# Patient Record
Sex: MALE | Race: WHITE | NOT HISPANIC OR LATINO | Employment: FULL TIME | URBAN - METROPOLITAN AREA
[De-identification: names, ages, dates, MRNs, and addresses within clinical notes are randomized per-mention and may not be internally consistent; named-entity substitution may affect disease eponyms.]

---

## 2017-05-27 LAB — RAPID GROUP A STREP (OHS): POSITIVE

## 2020-02-06 ENCOUNTER — HISTORICAL (OUTPATIENT)
Dept: LAB | Facility: HOSPITAL | Age: 35
End: 2020-02-06

## 2020-02-06 LAB
ABS NEUT (OLG): 7.08 X10(3)/MCL (ref 2.1–9.2)
ALBUMIN SERPL-MCNC: 4.3 GM/DL (ref 3.4–5)
ALBUMIN/GLOB SERPL: 1.8 {RATIO}
ALP SERPL-CCNC: 71 UNIT/L (ref 50–136)
ALT SERPL-CCNC: 64 UNIT/L (ref 12–78)
AST SERPL-CCNC: 18 UNIT/L (ref 15–37)
BASOPHILS # BLD AUTO: 0.1 X10(3)/MCL (ref 0–0.2)
BASOPHILS NFR BLD AUTO: 1 %
BILIRUB SERPL-MCNC: 0.5 MG/DL (ref 0.2–1)
BILIRUBIN DIRECT+TOT PNL SERPL-MCNC: 0.1 MG/DL (ref 0–0.2)
BILIRUBIN DIRECT+TOT PNL SERPL-MCNC: 0.4 MG/DL (ref 0–0.8)
BUN SERPL-MCNC: 10 MG/DL (ref 7–18)
CALCIUM SERPL-MCNC: 9.5 MG/DL (ref 8.5–10.1)
CHLORIDE SERPL-SCNC: 110 MMOL/L (ref 98–107)
CHOLEST SERPL-MCNC: 168 MG/DL (ref 0–200)
CHOLEST/HDLC SERPL: 5.6 {RATIO} (ref 0–5)
CO2 SERPL-SCNC: 25 MMOL/L (ref 21–32)
CREAT SERPL-MCNC: 0.93 MG/DL (ref 0.7–1.3)
EOSINOPHIL # BLD AUTO: 0.4 X10(3)/MCL (ref 0–0.9)
EOSINOPHIL NFR BLD AUTO: 4 %
ERYTHROCYTE [DISTWIDTH] IN BLOOD BY AUTOMATED COUNT: 12.6 % (ref 11.5–17)
EST. AVERAGE GLUCOSE BLD GHB EST-MCNC: 97 MG/DL
GLOBULIN SER-MCNC: 2.4 GM/DL (ref 2.4–3.5)
GLUCOSE SERPL-MCNC: 97 MG/DL (ref 74–106)
HBA1C MFR BLD: 5 % (ref 4.2–6.3)
HCT VFR BLD AUTO: 49.3 % (ref 42–52)
HDLC SERPL-MCNC: 30 MG/DL (ref 35–60)
HGB BLD-MCNC: 16.4 GM/DL (ref 14–18)
LDLC SERPL CALC-MCNC: 103 MG/DL (ref 0–129)
LYMPHOCYTES # BLD AUTO: 2.4 X10(3)/MCL (ref 0.6–4.6)
LYMPHOCYTES NFR BLD AUTO: 22 %
MCH RBC QN AUTO: 29.4 PG (ref 27–31)
MCHC RBC AUTO-ENTMCNC: 33.3 GM/DL (ref 33–36)
MCV RBC AUTO: 88.4 FL (ref 80–94)
MONOCYTES # BLD AUTO: 0.7 X10(3)/MCL (ref 0.1–1.3)
MONOCYTES NFR BLD AUTO: 7 %
NEUTROPHILS # BLD AUTO: 7.08 X10(3)/MCL (ref 2.1–9.2)
NEUTROPHILS NFR BLD AUTO: 65 %
PLATELET # BLD AUTO: 195 X10(3)/MCL (ref 130–400)
PMV BLD AUTO: 11.5 FL (ref 9.4–12.4)
POTASSIUM SERPL-SCNC: 4.3 MMOL/L (ref 3.5–5.1)
PROT SERPL-MCNC: 6.7 GM/DL (ref 6.4–8.2)
RBC # BLD AUTO: 5.58 X10(6)/MCL (ref 4.7–6.1)
SODIUM SERPL-SCNC: 141 MMOL/L (ref 136–145)
TRIGL SERPL-MCNC: 175 MG/DL (ref 30–150)
TSH SERPL-ACNC: 0.83 MIU/L (ref 0.36–3.74)
VLDLC SERPL CALC-MCNC: 35 MG/DL
WBC # SPEC AUTO: 10.8 X10(3)/MCL (ref 4.5–11.5)

## 2021-02-24 ENCOUNTER — HISTORICAL (OUTPATIENT)
Dept: ADMINISTRATIVE | Facility: HOSPITAL | Age: 36
End: 2021-02-24

## 2021-02-24 LAB
ABS NEUT (OLG): 6.46 X10(3)/MCL (ref 2.1–9.2)
ALBUMIN SERPL-MCNC: 4.5 GM/DL (ref 3.5–5)
ALBUMIN/GLOB SERPL: 2 RATIO (ref 1.1–2)
ALP SERPL-CCNC: 73 UNIT/L (ref 40–150)
ALT SERPL-CCNC: 50 UNIT/L (ref 0–55)
AST SERPL-CCNC: 29 UNIT/L (ref 5–34)
BASOPHILS # BLD AUTO: 0.1 X10(3)/MCL (ref 0–0.2)
BASOPHILS NFR BLD AUTO: 1 %
BILIRUB SERPL-MCNC: 0.6 MG/DL
BILIRUBIN DIRECT+TOT PNL SERPL-MCNC: 0.2 MG/DL (ref 0–0.5)
BILIRUBIN DIRECT+TOT PNL SERPL-MCNC: 0.4 MG/DL (ref 0–0.8)
BUN SERPL-MCNC: 11.6 MG/DL (ref 8.9–20.6)
CALCIUM SERPL-MCNC: 9.4 MG/DL (ref 8.4–10.2)
CHLORIDE SERPL-SCNC: 107 MMOL/L (ref 98–107)
CHOLEST SERPL-MCNC: 165 MG/DL
CHOLEST/HDLC SERPL: 6 {RATIO} (ref 0–5)
CO2 SERPL-SCNC: 27 MMOL/L (ref 22–29)
CREAT SERPL-MCNC: 1.04 MG/DL (ref 0.73–1.18)
EOSINOPHIL # BLD AUTO: 0.5 X10(3)/MCL (ref 0–0.9)
EOSINOPHIL NFR BLD AUTO: 5 %
ERYTHROCYTE [DISTWIDTH] IN BLOOD BY AUTOMATED COUNT: 12.6 % (ref 11.5–17)
EST. AVERAGE GLUCOSE BLD GHB EST-MCNC: 96.8 MG/DL
GLOBULIN SER-MCNC: 2.3 GM/DL (ref 2.4–3.5)
GLUCOSE SERPL-MCNC: 92 MG/DL (ref 74–100)
HBA1C MFR BLD: 5 %
HCT VFR BLD AUTO: 49 % (ref 42–52)
HDLC SERPL-MCNC: 29 MG/DL (ref 35–60)
HGB BLD-MCNC: 16.3 GM/DL (ref 14–18)
LDLC SERPL CALC-MCNC: 92 MG/DL (ref 50–140)
LYMPHOCYTES # BLD AUTO: 2.6 X10(3)/MCL (ref 0.6–4.6)
LYMPHOCYTES NFR BLD AUTO: 25 %
MCH RBC QN AUTO: 29.9 PG (ref 27–31)
MCHC RBC AUTO-ENTMCNC: 33.3 GM/DL (ref 33–36)
MCV RBC AUTO: 89.7 FL (ref 80–94)
MONOCYTES # BLD AUTO: 0.7 X10(3)/MCL (ref 0.1–1.3)
MONOCYTES NFR BLD AUTO: 7 %
NEUTROPHILS # BLD AUTO: 6.46 X10(3)/MCL (ref 2.1–9.2)
NEUTROPHILS NFR BLD AUTO: 62 %
PLATELET # BLD AUTO: 253 X10(3)/MCL (ref 130–400)
PMV BLD AUTO: 11.3 FL (ref 9.4–12.4)
POTASSIUM SERPL-SCNC: 5.4 MMOL/L (ref 3.5–5.1)
PROT SERPL-MCNC: 6.8 GM/DL (ref 6.4–8.3)
RBC # BLD AUTO: 5.46 X10(6)/MCL (ref 4.7–6.1)
SODIUM SERPL-SCNC: 142 MMOL/L (ref 136–145)
TRIGL SERPL-MCNC: 220 MG/DL (ref 34–140)
TSH SERPL-ACNC: 1.13 UIU/ML (ref 0.35–4.94)
VLDLC SERPL CALC-MCNC: 44 MG/DL
WBC # SPEC AUTO: 10.4 X10(3)/MCL (ref 4.5–11.5)

## 2022-03-11 ENCOUNTER — HISTORICAL (OUTPATIENT)
Dept: ADMINISTRATIVE | Facility: HOSPITAL | Age: 37
End: 2022-03-11

## 2022-03-11 LAB
ABS NEUT (OLG): 5.48 (ref 2.1–9.2)
ALBUMIN SERPL-MCNC: 4.6 G/DL (ref 3.5–5)
ALBUMIN/GLOB SERPL: 1.8 {RATIO} (ref 1.1–2)
ALP SERPL-CCNC: 68 U/L (ref 40–150)
ALT SERPL-CCNC: 57 U/L (ref 0–55)
AST SERPL-CCNC: 34 U/L (ref 5–34)
BASOPHILS # BLD AUTO: 0.1 10*3/UL (ref 0–0.2)
BASOPHILS NFR BLD AUTO: 1 %
BILIRUB SERPL-MCNC: 0.8 MG/DL
BILIRUBIN DIRECT+TOT PNL SERPL-MCNC: 0.3 (ref 0–0.5)
BILIRUBIN DIRECT+TOT PNL SERPL-MCNC: 0.5 (ref 0–0.8)
BUN SERPL-MCNC: 12.9 MG/DL (ref 8.9–20.6)
CALCIUM SERPL-MCNC: 10.2 MG/DL (ref 8.7–10.5)
CHLORIDE SERPL-SCNC: 103 MMOL/L (ref 98–107)
CHOLEST SERPL-MCNC: 194 MG/DL
CHOLEST/HDLC SERPL: 6 {RATIO} (ref 0–5)
CO2 SERPL-SCNC: 23 MMOL/L (ref 22–29)
CREAT SERPL-MCNC: 1.07 MG/DL (ref 0.73–1.18)
EOSINOPHIL # BLD AUTO: 0.4 10*3/UL (ref 0–0.9)
EOSINOPHIL NFR BLD AUTO: 4 %
ERYTHROCYTE [DISTWIDTH] IN BLOOD BY AUTOMATED COUNT: 14.9 % (ref 11.5–17)
EST. AVERAGE GLUCOSE BLD GHB EST-MCNC: 96.8 MG/DL
GLOBULIN SER-MCNC: 2.6 G/DL (ref 2.4–3.5)
GLUCOSE SERPL-MCNC: 31 MG/DL (ref 74–100)
HBA1C MFR BLD: 5 %
HCT VFR BLD AUTO: 52.4 % (ref 42–52)
HDLC SERPL-MCNC: 34 MG/DL (ref 35–60)
HEMOLYSIS INTERF INDEX SERPL-ACNC: 26
HGB BLD-MCNC: 16.6 G/DL (ref 14–18)
ICTERIC INTERF INDEX SERPL-ACNC: 1
LDLC SERPL CALC-MCNC: 130 MG/DL (ref 50–140)
LIPEMIC INTERF INDEX SERPL-ACNC: 8
LYMPHOCYTES # BLD AUTO: 2.1 10*3/UL (ref 0.6–4.6)
LYMPHOCYTES NFR BLD AUTO: 23 %
MANUAL DIFF? (OHS): NO
MCH RBC QN AUTO: 30.3 PG (ref 27–31)
MCHC RBC AUTO-ENTMCNC: 31.7 G/DL (ref 33–36)
MCV RBC AUTO: 95.8 FL (ref 80–94)
MONOCYTES # BLD AUTO: 0.8 10*3/UL (ref 0.1–1.3)
MONOCYTES NFR BLD AUTO: 8 %
NEUTROPHILS # BLD AUTO: 5.48 10*3/UL (ref 2.1–9.2)
NEUTROPHILS NFR BLD AUTO: 62 %
PLATELET # BLD AUTO: 225 10*3/UL (ref 130–400)
PMV BLD AUTO: 11.5 FL (ref 9.4–12.4)
POTASSIUM SERPL-SCNC: 4 MMOL/L (ref 3.5–5.1)
PROT SERPL-MCNC: 7.2 G/DL (ref 6.4–8.3)
RBC # BLD AUTO: 5.47 10*6/UL (ref 4.7–6.1)
SODIUM SERPL-SCNC: 144 MMOL/L (ref 136–145)
TRIGL SERPL-MCNC: 148 MG/DL (ref 34–140)
TSH SERPL-ACNC: 1.24 M[IU]/L (ref 0.35–4.94)
VLDLC SERPL CALC-MCNC: 30 MG/DL
WBC # SPEC AUTO: 8.8 10*3/UL (ref 4.5–11.5)

## 2022-04-10 ENCOUNTER — HISTORICAL (OUTPATIENT)
Dept: ADMINISTRATIVE | Facility: HOSPITAL | Age: 37
End: 2022-04-10

## 2022-04-30 VITALS
OXYGEN SATURATION: 98 % | HEIGHT: 69 IN | SYSTOLIC BLOOD PRESSURE: 134 MMHG | WEIGHT: 197.75 LBS | DIASTOLIC BLOOD PRESSURE: 85 MMHG | BODY MASS INDEX: 29.29 KG/M2

## 2022-09-16 ENCOUNTER — HISTORICAL (OUTPATIENT)
Dept: ADMINISTRATIVE | Facility: HOSPITAL | Age: 37
End: 2022-09-16

## 2022-10-26 ENCOUNTER — OFFICE VISIT (OUTPATIENT)
Dept: URGENT CARE | Facility: CLINIC | Age: 37
End: 2022-10-26
Payer: COMMERCIAL

## 2022-10-26 VITALS
BODY MASS INDEX: 29.18 KG/M2 | OXYGEN SATURATION: 96 % | TEMPERATURE: 102 F | RESPIRATION RATE: 18 BRPM | SYSTOLIC BLOOD PRESSURE: 118 MMHG | DIASTOLIC BLOOD PRESSURE: 79 MMHG | WEIGHT: 197 LBS | HEART RATE: 132 BPM | HEIGHT: 69 IN

## 2022-10-26 DIAGNOSIS — R68.89 FLU-LIKE SYMPTOMS: Primary | ICD-10-CM

## 2022-10-26 DIAGNOSIS — R05.9 COUGH, UNSPECIFIED TYPE: ICD-10-CM

## 2022-10-26 DIAGNOSIS — Z20.828 EXPOSURE TO THE FLU: ICD-10-CM

## 2022-10-26 LAB
CTP QC/QA: YES
CTP QC/QA: YES
FLUAV AG NPH QL: NEGATIVE
FLUBV AG NPH QL: NEGATIVE
SARS-COV-2 RDRP RESP QL NAA+PROBE: NEGATIVE

## 2022-10-26 PROCEDURE — 99213 OFFICE O/P EST LOW 20 MIN: CPT | Mod: 25,,, | Performed by: FAMILY MEDICINE

## 2022-10-26 PROCEDURE — 87804 POCT INFLUENZA A/B: ICD-10-PCS | Mod: QW,,, | Performed by: FAMILY MEDICINE

## 2022-10-26 PROCEDURE — 1159F PR MEDICATION LIST DOCUMENTED IN MEDICAL RECORD: ICD-10-PCS | Mod: CPTII,,, | Performed by: FAMILY MEDICINE

## 2022-10-26 PROCEDURE — 99213 PR OFFICE/OUTPT VISIT, EST, LEVL III, 20-29 MIN: ICD-10-PCS | Mod: 25,,, | Performed by: FAMILY MEDICINE

## 2022-10-26 PROCEDURE — 87804 INFLUENZA ASSAY W/OPTIC: CPT | Mod: QW,,, | Performed by: FAMILY MEDICINE

## 2022-10-26 PROCEDURE — 3078F DIAST BP <80 MM HG: CPT | Mod: CPTII,,, | Performed by: FAMILY MEDICINE

## 2022-10-26 PROCEDURE — 1160F PR REVIEW ALL MEDS BY PRESCRIBER/CLIN PHARMACIST DOCUMENTED: ICD-10-PCS | Mod: CPTII,,, | Performed by: FAMILY MEDICINE

## 2022-10-26 PROCEDURE — 1159F MED LIST DOCD IN RCRD: CPT | Mod: CPTII,,, | Performed by: FAMILY MEDICINE

## 2022-10-26 PROCEDURE — 3074F SYST BP LT 130 MM HG: CPT | Mod: CPTII,,, | Performed by: FAMILY MEDICINE

## 2022-10-26 PROCEDURE — 3078F PR MOST RECENT DIASTOLIC BLOOD PRESSURE < 80 MM HG: ICD-10-PCS | Mod: CPTII,,, | Performed by: FAMILY MEDICINE

## 2022-10-26 PROCEDURE — 87635: ICD-10-PCS | Mod: QW,,, | Performed by: FAMILY MEDICINE

## 2022-10-26 PROCEDURE — 1160F RVW MEDS BY RX/DR IN RCRD: CPT | Mod: CPTII,,, | Performed by: FAMILY MEDICINE

## 2022-10-26 PROCEDURE — 87635 SARS-COV-2 COVID-19 AMP PRB: CPT | Mod: QW,,, | Performed by: FAMILY MEDICINE

## 2022-10-26 PROCEDURE — 3074F PR MOST RECENT SYSTOLIC BLOOD PRESSURE < 130 MM HG: ICD-10-PCS | Mod: CPTII,,, | Performed by: FAMILY MEDICINE

## 2022-10-26 RX ORDER — BALOXAVIR MARBOXIL 80 MG/1
80 TABLET, FILM COATED ORAL ONCE
Qty: 1 TABLET | Refills: 0 | Status: SHIPPED | OUTPATIENT
Start: 2022-10-26 | End: 2022-10-26

## 2022-10-26 RX ORDER — OSELTAMIVIR PHOSPHATE 75 MG/1
75 CAPSULE ORAL 2 TIMES DAILY
Qty: 10 CAPSULE | Refills: 0 | Status: SHIPPED | OUTPATIENT
Start: 2022-10-26 | End: 2022-10-31

## 2022-10-26 NOTE — PATIENT INSTRUCTIONS
COVID negative flu negative.  Given your flu-like symptoms, your exposure to flu, we will treat for flu  Increase fluid intake. Monitor for fever. Take tylenol/acetaminophen as needed for headache, bodyaches or fever.   Treat your symptoms like the common cold, take Delysm as needed for cough, claritin and flonase for congestion, for exampl.   Complications for flu include pneumonia, bronchitis, and sinusitis.   Stay home for 5 to 7 days total starting from when your symptoms began.  If your symptoms worsen, or you develop shortness of breath, worsening of cough, or fever over 102.5, seek medical attention immediately.

## 2022-10-26 NOTE — PROGRESS NOTES
"Subjective:       Patient ID: Wilman Cano is a 36 y.o. male.    Vitals:  height is 5' 9" (1.753 m) and weight is 89.4 kg (197 lb). His oral temperature is 101.9 °F (38.8 °C) (abnormal). His blood pressure is 118/79 and his pulse is 132 (abnormal). His respiration is 18 and oxygen saturation is 96%.     Chief Complaint: Fever    36 y.o. male presents to clinic w/ c/o fever (high of 104.0), body aches, productive cough w/ green sputum x1d. Alleviating factors used include Dayquil w/ no improvement.  Denies any shortness of breath.  States that he was exposed to flu by his daughter.    Fever     Constitution: Positive for fever.   HENT: Negative.     Neck: neck negative.   Cardiovascular: Negative.    Eyes: Negative.    Respiratory: Negative.     Gastrointestinal: Negative.    Genitourinary: Negative.    Musculoskeletal: Negative.    Skin: Negative.    Allergic/Immunologic: Negative.    Neurological: Negative.    Hematologic/Lymphatic: Negative.      Objective:      Physical Exam   Constitutional: He is oriented to person, place, and time. He appears well-developed. He is cooperative.  Non-toxic appearance. He does not appear ill. No distress.   HENT:   Head: Normocephalic and atraumatic.   Ears:   Right Ear: Hearing and external ear normal.   Left Ear: Hearing and external ear normal.   Nose: No mucosal edema or nasal deformity. No epistaxis. Right sinus exhibits no maxillary sinus tenderness and no frontal sinus tenderness. Left sinus exhibits no maxillary sinus tenderness and no frontal sinus tenderness.   Mouth/Throat: Uvula is midline and mucous membranes are normal. No trismus in the jaw. Normal dentition. No uvula swelling. No posterior oropharyngeal edema or posterior oropharyngeal erythema.   Eyes: Conjunctivae and lids are normal.   Neck: Trachea normal and phonation normal. Neck supple. No edema present. No erythema present. No neck rigidity present.   Cardiovascular: Normal rate, regular rhythm and normal " "heart sounds.   Pulmonary/Chest: Effort normal and breath sounds normal. No stridor. No respiratory distress. He has no decreased breath sounds. He has no wheezes. He has no rhonchi. He has no rales.   Abdominal: Normal appearance.   Neurological: He is alert and oriented to person, place, and time. He exhibits normal muscle tone.   Skin: Skin is warm, intact and not diaphoretic.   Psychiatric: His speech is normal and behavior is normal. Mood, judgment and thought content normal.   Nursing note and vitals reviewed.         Previous History      Review of patient's allergies indicates:  No Known Allergies    Past Medical History:   Diagnosis Date    Known health problems: none      Current Outpatient Medications   Medication Instructions    oseltamivir (TAMIFLU) 75 mg, Oral, 2 times daily    XOFLUZA 80 mg, Oral, Once     Past Surgical History:   Procedure Laterality Date    NO PAST SURGERIES       Family History   Problem Relation Age of Onset    No Known Problems Mother     No Known Problems Father     No Known Problems Sister     No Known Problems Brother        Social History     Tobacco Use    Smoking status: Every Day     Types: Cigarettes    Smokeless tobacco: Never   Substance Use Topics    Alcohol use: Never    Drug use: Never        Physical Exam      Vital Signs Reviewed   /79   Pulse (!) 132   Temp (!) 101.9 °F (38.8 °C) (Oral)   Resp 18   Ht 5' 9" (1.753 m)   Wt 89.4 kg (197 lb)   SpO2 96%   BMI 29.09 kg/m²        Procedures    Procedures     Labs     Results for orders placed or performed in visit on 10/26/22   POCT COVID-19 Rapid Screening   Result Value Ref Range    POC Rapid COVID Negative Negative     Acceptable Yes    POCT Influenza A/B   Result Value Ref Range    Rapid Influenza A Ag Negative Negative    Rapid Influenza B Ag Negative Negative     Acceptable Yes        Assessment:       1. Flu-like symptoms    2. Cough, unspecified type    3. Exposure to " the flu          Plan:       COVID negative flu negative.  Given your flu-like symptoms, your exposure to flu, we will treat for flu  Increase fluid intake. Monitor for fever. Take tylenol/acetaminophen as needed for headache, bodyaches or fever.   Treat your symptoms like the common cold, take Delysm as needed for cough, claritin and flonase for congestion, for exampl.   Complications for flu include pneumonia, bronchitis, and sinusitis.   Stay home for 5 to 7 days total starting from when your symptoms began.  If your symptoms worsen, or you develop shortness of breath, worsening of cough, or fever over 102.5, seek medical attention immediately.     Flu-like symptoms    Cough, unspecified type  -     POCT COVID-19 Rapid Screening  -     POCT Influenza A/B    Exposure to the flu    Other orders  -     baloxavir marboxiL (XOFLUZA) 80 mg tablet; Take 1 tablet (80 mg total) by mouth once. for 1 dose  Dispense: 1 tablet; Refill: 0  -     oseltamivir (TAMIFLU) 75 MG capsule; Take 1 capsule (75 mg total) by mouth 2 (two) times daily. for 5 days  Dispense: 10 capsule; Refill: 0

## 2023-03-09 DIAGNOSIS — Z13.220 ENCOUNTER FOR LIPID SCREENING FOR CARDIOVASCULAR DISEASE: ICD-10-CM

## 2023-03-09 DIAGNOSIS — Z00.00 ENCOUNTER FOR WELLNESS EXAMINATION: Primary | ICD-10-CM

## 2023-03-09 DIAGNOSIS — Z13.6 ENCOUNTER FOR LIPID SCREENING FOR CARDIOVASCULAR DISEASE: ICD-10-CM

## 2023-03-13 ENCOUNTER — CLINICAL SUPPORT (OUTPATIENT)
Dept: PRIMARY CARE CLINIC | Facility: CLINIC | Age: 38
End: 2023-03-13
Payer: COMMERCIAL

## 2023-03-13 DIAGNOSIS — Z13.6 ENCOUNTER FOR LIPID SCREENING FOR CARDIOVASCULAR DISEASE: ICD-10-CM

## 2023-03-13 DIAGNOSIS — Z00.00 ENCOUNTER FOR WELLNESS EXAMINATION: Primary | ICD-10-CM

## 2023-03-13 DIAGNOSIS — Z13.220 ENCOUNTER FOR LIPID SCREENING FOR CARDIOVASCULAR DISEASE: ICD-10-CM

## 2023-03-13 LAB
ALBUMIN SERPL-MCNC: 4.2 G/DL (ref 3.5–5)
ALBUMIN/GLOB SERPL: 1.7 RATIO (ref 1.1–2)
ALP SERPL-CCNC: 72 UNIT/L (ref 40–150)
ALT SERPL-CCNC: 38 UNIT/L (ref 0–55)
AST SERPL-CCNC: 32 UNIT/L (ref 5–34)
BASOPHILS # BLD AUTO: 0.1 X10(3)/MCL (ref 0–0.2)
BASOPHILS NFR BLD AUTO: 1 %
BILIRUBIN DIRECT+TOT PNL SERPL-MCNC: 0.4 MG/DL
BUN SERPL-MCNC: 8.1 MG/DL (ref 8.9–20.6)
CALCIUM SERPL-MCNC: 9.5 MG/DL (ref 8.4–10.2)
CHLORIDE SERPL-SCNC: 111 MMOL/L (ref 98–107)
CHOLEST SERPL-MCNC: 155 MG/DL
CHOLEST/HDLC SERPL: 5 {RATIO} (ref 0–5)
CO2 SERPL-SCNC: 26 MMOL/L (ref 22–29)
CREAT SERPL-MCNC: 1.06 MG/DL (ref 0.73–1.18)
EOSINOPHIL # BLD AUTO: 0.35 X10(3)/MCL (ref 0–0.9)
EOSINOPHIL NFR BLD AUTO: 3.3 %
ERYTHROCYTE [DISTWIDTH] IN BLOOD BY AUTOMATED COUNT: 13 % (ref 11.5–17)
EST. AVERAGE GLUCOSE BLD GHB EST-MCNC: 99.7 MG/DL
GFR SERPLBLD CREATININE-BSD FMLA CKD-EPI: >60 MLS/MIN/1.73/M2
GLOBULIN SER-MCNC: 2.5 GM/DL (ref 2.4–3.5)
GLUCOSE SERPL-MCNC: 107 MG/DL (ref 74–100)
HBA1C MFR BLD: 5.1 %
HCT VFR BLD AUTO: 47.4 % (ref 42–52)
HDLC SERPL-MCNC: 31 MG/DL (ref 35–60)
HGB BLD-MCNC: 16.2 G/DL (ref 14–18)
IMM GRANULOCYTES # BLD AUTO: 0.12 X10(3)/MCL (ref 0–0.04)
IMM GRANULOCYTES NFR BLD AUTO: 1.1 %
LDLC SERPL CALC-MCNC: 54 MG/DL (ref 50–140)
LYMPHOCYTES # BLD AUTO: 2.68 X10(3)/MCL (ref 0.6–4.6)
LYMPHOCYTES NFR BLD AUTO: 25.6 %
MCH RBC QN AUTO: 29.8 PG
MCHC RBC AUTO-ENTMCNC: 34.2 G/DL (ref 33–36)
MCV RBC AUTO: 87.1 FL (ref 80–94)
MONOCYTES # BLD AUTO: 0.66 X10(3)/MCL (ref 0.1–1.3)
MONOCYTES NFR BLD AUTO: 6.3 %
NEUTROPHILS # BLD AUTO: 6.57 X10(3)/MCL (ref 2.1–9.2)
NEUTROPHILS NFR BLD AUTO: 62.7 %
NRBC BLD AUTO-RTO: 0 %
PLATELET # BLD AUTO: 209 X10(3)/MCL (ref 130–400)
PMV BLD AUTO: 11.7 FL (ref 7.4–10.4)
POTASSIUM SERPL-SCNC: 5 MMOL/L (ref 3.5–5.1)
PROT SERPL-MCNC: 6.7 GM/DL (ref 6.4–8.3)
RBC # BLD AUTO: 5.44 X10(6)/MCL (ref 4.7–6.1)
SODIUM SERPL-SCNC: 143 MMOL/L (ref 136–145)
TRIGL SERPL-MCNC: 349 MG/DL (ref 34–140)
TSH SERPL-ACNC: 1.91 UIU/ML (ref 0.35–4.94)
VLDLC SERPL CALC-MCNC: 70 MG/DL
WBC # SPEC AUTO: 10.5 X10(3)/MCL (ref 4.5–11.5)

## 2023-03-13 PROCEDURE — 80053 COMPREHEN METABOLIC PANEL: CPT | Performed by: GENERAL PRACTICE

## 2023-03-13 PROCEDURE — 80061 LIPID PANEL: CPT | Performed by: GENERAL PRACTICE

## 2023-03-13 PROCEDURE — 36415 COLL VENOUS BLD VENIPUNCTURE: CPT

## 2023-03-13 PROCEDURE — 36415 PR COLLECTION VENOUS BLOOD,VENIPUNCTURE: ICD-10-PCS | Mod: ,,, | Performed by: GENERAL PRACTICE

## 2023-03-13 PROCEDURE — 86803 HEPATITIS C AB TEST: CPT | Performed by: GENERAL PRACTICE

## 2023-03-13 PROCEDURE — 83036 HEMOGLOBIN GLYCOSYLATED A1C: CPT | Performed by: GENERAL PRACTICE

## 2023-03-13 PROCEDURE — 84443 ASSAY THYROID STIM HORMONE: CPT | Performed by: GENERAL PRACTICE

## 2023-03-13 PROCEDURE — 36415 COLL VENOUS BLD VENIPUNCTURE: CPT | Mod: ,,, | Performed by: GENERAL PRACTICE

## 2023-03-13 PROCEDURE — 85025 COMPLETE CBC W/AUTO DIFF WBC: CPT | Performed by: GENERAL PRACTICE

## 2023-03-14 LAB — HCV AB SERPL QL IA: NONREACTIVE

## 2023-03-15 PROBLEM — E78.2 MIXED HYPERLIPIDEMIA: Status: RESOLVED | Noted: 2023-03-15 | Resolved: 2023-03-15

## 2023-03-15 PROBLEM — E66.9 OBESITY: Chronic | Status: ACTIVE | Noted: 2023-03-15

## 2023-03-15 PROBLEM — E78.5 DYSLIPIDEMIA: Chronic | Status: ACTIVE | Noted: 2023-03-15

## 2023-03-15 PROBLEM — E78.5 DYSLIPIDEMIA: Status: ACTIVE | Noted: 2023-03-15

## 2023-03-15 PROBLEM — E78.2 MIXED HYPERLIPIDEMIA: Status: ACTIVE | Noted: 2023-03-15

## 2023-03-15 PROBLEM — F17.210 CIGARETTE NICOTINE DEPENDENCE WITHOUT COMPLICATION: Chronic | Status: ACTIVE | Noted: 2023-03-15

## 2023-03-15 PROBLEM — F17.200 NICOTINE DEPENDENCE: Status: ACTIVE | Noted: 2023-03-15

## 2023-03-15 PROBLEM — E66.9 OBESITY: Status: ACTIVE | Noted: 2023-03-15

## 2023-03-15 NOTE — PROGRESS NOTES
"Subjective:       Patient ID: Wilman Cano is a 37 y.o. male.    Chief Complaint: Annual Exam    Patient presents to the clinic today for wellness examination.  Current and past medical history reviewed.  Pertinent family and social history reviewed.    Vaccinations due:  Patient will receive any vaccinations that are currently due and desired.    No complaints today.      Review of Systems   Constitutional: Negative.  Negative for fatigue and fever.   HENT: Negative.  Negative for sore throat and trouble swallowing.    Eyes: Negative.  Negative for redness and visual disturbance.   Respiratory: Negative.  Negative for chest tightness and shortness of breath.    Cardiovascular: Negative.  Negative for chest pain.   Gastrointestinal: Negative.  Negative for abdominal pain, blood in stool and nausea.   Endocrine: Negative.  Negative for cold intolerance, heat intolerance and polyuria.   Genitourinary: Negative.    Musculoskeletal: Negative.  Negative for arthralgias, gait problem and joint swelling.   Integumentary:  Negative for rash. Negative.   Neurological: Negative.  Negative for dizziness, weakness and headaches.   Psychiatric/Behavioral: Negative.  Negative for agitation and dysphoric mood.        Objective:     Vitals:    03/16/23 0748   BP: (!) 144/83   Pulse: 87   Resp: 18   SpO2: 98%   Weight: 90.7 kg (200 lb)   Height: 5' 9" (1.753 m)   Body mass index is 29.53 kg/m².     Physical Exam  Constitutional:       Appearance: Normal appearance.   HENT:      Head: Normocephalic.      Mouth/Throat:      Pharynx: Oropharynx is clear.   Eyes:      Conjunctiva/sclera: Conjunctivae normal.      Pupils: Pupils are equal, round, and reactive to light.   Cardiovascular:      Rate and Rhythm: Normal rate and regular rhythm.      Heart sounds: Normal heart sounds.   Pulmonary:      Effort: Pulmonary effort is normal.      Breath sounds: Normal breath sounds.   Abdominal:      General: Abdomen is flat.      Palpations: " Abdomen is soft.   Musculoskeletal:         General: Normal range of motion.      Cervical back: Neck supple.   Skin:     General: Skin is warm and dry.   Neurological:      General: No focal deficit present.      Mental Status: He is oriented to person, place, and time.   Psychiatric:         Mood and Affect: Mood normal.         Behavior: Behavior normal.         Thought Content: Thought content normal.         Judgment: Judgment normal.         Lab Results   Component Value Date     03/13/2023    K 5.0 03/13/2023    CO2 26 03/13/2023    BUN 8.1 (L) 03/13/2023    CREATININE 1.06 03/13/2023    CALCIUM 9.5 03/13/2023    ALBUMIN 4.2 03/13/2023    BILITOT 0.4 03/13/2023    ALKPHOS 72 03/13/2023    AST 32 03/13/2023    ALT 38 03/13/2023    EGFRNONAA >60 03/11/2022     Lab Results   Component Value Date    WBC 10.5 03/13/2023    RBC 5.44 03/13/2023    HGB 16.2 03/13/2023    HCT 47.4 03/13/2023    MCV 87.1 03/13/2023    RDW 13.0 03/13/2023     03/13/2023      Lab Results   Component Value Date    CHOL 155 03/13/2023    TRIG 349 (H) 03/13/2023    HDL 31 (L) 03/13/2023    LDL 54.00 03/13/2023    TOTALCHOLEST 5 03/13/2023     Lab Results   Component Value Date    TSH 1.906 03/13/2023     Lab Results   Component Value Date    HGBA1C 5.1 03/13/2023          Assessment:     Problem List Items Addressed This Visit          Derm    Hand dermatitis (Chronic)    Current Assessment & Plan     Patient does have hand dermatitis, not severe but more than likely something that he is coming into contact with at work.  I prescribed triamcinolone for him to use as needed, and he should watch his environment, hopefully avoiding anything that is causing this in the future.         Relevant Medications    triamcinolone acetonide 0.1% (KENALOG) 0.1 % cream       Cardiac/Vascular    Dyslipidemia (Chronic)    Current Assessment & Plan     Lipids continue to be elevated, triglycerides look somewhat worse, LDL looks better.  HDL  "continues to be low.  We will continue lifestyle modification at this time.      Component Ref Range & Units 3 d ago 1 yr ago 2 yr ago 3 yr ago   Cholesterol Total <=200 mg/dL 155  194 R  165 R  168 R    HDL Cholesterol 35 - 60 mg/dL 31 Low   34 R  29 Low   30 Low     Triglyceride 34 - 140 mg/dL 349 High   148 R  220 High   175 High  R    Cholesterol/HDL Ratio 0 - 5 5  6  6 High   5.6 High  R    Very Low Density Lipoprotein  70  30  44  35 R    LDL Cholesterol 50.00 - 140.00 mg/dL 54.00  130.00 R  92.00  103 R        Consume a diet low in saturated fats, (fats that are solid at room temperature such as animal fat) and trans fats, using healthy fats if necessary, olive oil and canola oil are 2 examples.  Increasing daily fiber intake can be very important in controlling cholesterol elevation as well.  Weight loss, especially weight loss associated with exercise can significantly lower lipid levels.  During future visits, depending on response to dietary changes, medication or change in dosage if already on lipid lowering medications may be considered if lipid levels continue to be significantly elevated.         Relevant Orders    Lipid Panel    Family history of early CAD (Chronic)    Current Assessment & Plan     We will continue to monitor all modifiable risk factors to reduce overall cardiac risk.            Endocrine    Overweight (BMI 25.0-29.9) (Chronic)    Overview     Weight loss, healthy dietary choices, increased activity/exercise are recommended.  To lose weight, it is generally recommended to restrict calories to approximately 1500 calories per day to lose 1 lb per week, and approximately 1200 calories per day to lose up to 2 lb per week.  Generally, this is a healthy amount of weight to lose, and an appropriate amount of time to lose this amount of weight.  Adding exercise will assist in losing weight, particularly aerobic exercise such as walking.  However, resistance training, or lifting weights" " "over time may assistant weight loss by increasing basal metabolic rate, or the rate at which your body burns calories during periods of inactivity.    Keep track of BMI (body mass index), below 25 is considered normal, over 25 but below 30 is considered overweight, anything over 30 is considered moderately obese, over 35 severely obese, and over 40 is characterized as morbidly obese.         RESOLVED: Obesity (Chronic)    Current Assessment & Plan     Weight loss, healthy dietary choices, increased activity/exercise are recommended.  To lose weight, it is generally recommended to restrict calories to approximately 1500 calories per day to lose 1 lb per week, and approximately 1200 calories per day to lose up to 2 lb per week.  Generally, this is a healthy amount of weight to lose, and an appropriate amount of time to lose this amount of weight.  Adding exercise will assist in losing weight, particularly aerobic exercise such as walking.  However, resistance training, or lifting weights" over time may assistant weight loss by increasing basal metabolic rate, or the rate at which your body burns calories during periods of inactivity.    Keep track of BMI (body mass index), below 25 is considered normal, over 25 but below 30 is considered overweight, anything over 30 is considered moderately obese, over 35 severely obese, and over 40 is characterized as morbidly obese.            Other    Cigarette nicotine dependence without complication (Chronic)    Current Assessment & Plan     We spoke for quite a while about smoking cessation, I will prescribe Chantix, he seems to be serious about quitting.  We will revisit this issue at his next visit in three months.         Relevant Medications    varenicline (CHANTIX STARTING MONTH BOX) 0.5 mg (11)- 1 mg (42) tablet    varenicline (CHANTIX) 1 mg Tab    Other Relevant Orders    Lipid Panel     Other Visit Diagnoses       Wellness examination    -  Primary    Overall health " status was reviewed.  Good health habits reinforced.  Annual wellness exams recommended.    Relevant Orders    TSH    Hemoglobin A1C    Lipid Panel    Comprehensive Metabolic Panel    CBC Auto Differential                 Plan:             Follow up in about 53 weeks (around 3/21/2024) for Wellness.

## 2023-03-15 NOTE — ASSESSMENT & PLAN NOTE
We spoke for quite a while about smoking cessation, I will prescribe Chantix, he seems to be serious about quitting.  We will revisit this issue at his next visit in three months.

## 2023-03-15 NOTE — ASSESSMENT & PLAN NOTE
"Weight loss, healthy dietary choices, increased activity/exercise are recommended.  To lose weight, it is generally recommended to restrict calories to approximately 1500 calories per day to lose 1 lb per week, and approximately 1200 calories per day to lose up to 2 lb per week.  Generally, this is a healthy amount of weight to lose, and an appropriate amount of time to lose this amount of weight.  Adding exercise will assist in losing weight, particularly aerobic exercise such as walking.  However, resistance training, or lifting weights" over time may assistant weight loss by increasing basal metabolic rate, or the rate at which your body burns calories during periods of inactivity.    Keep track of BMI (body mass index), below 25 is considered normal, over 25 but below 30 is considered overweight, anything over 30 is considered moderately obese, over 35 severely obese, and over 40 is characterized as morbidly obese.  "

## 2023-03-15 NOTE — ASSESSMENT & PLAN NOTE
Lipids continue to be elevated, triglycerides look somewhat worse, LDL looks better.  HDL continues to be low.  We will continue lifestyle modification at this time.      Component Ref Range & Units 3 d ago 1 yr ago 2 yr ago 3 yr ago   Cholesterol Total <=200 mg/dL 155  194 R  165 R  168 R    HDL Cholesterol 35 - 60 mg/dL 31 Low   34 R  29 Low   30 Low     Triglyceride 34 - 140 mg/dL 349 High   148 R  220 High   175 High  R    Cholesterol/HDL Ratio 0 - 5 5  6  6 High   5.6 High  R    Very Low Density Lipoprotein  70  30  44  35 R    LDL Cholesterol 50.00 - 140.00 mg/dL 54.00  130.00 R  92.00  103 R        Consume a diet low in saturated fats, (fats that are solid at room temperature such as animal fat) and trans fats, using healthy fats if necessary, olive oil and canola oil are 2 examples.  Increasing daily fiber intake can be very important in controlling cholesterol elevation as well.  Weight loss, especially weight loss associated with exercise can significantly lower lipid levels.  During future visits, depending on response to dietary changes, medication or change in dosage if already on lipid lowering medications may be considered if lipid levels continue to be significantly elevated.

## 2023-03-16 ENCOUNTER — OFFICE VISIT (OUTPATIENT)
Dept: PRIMARY CARE CLINIC | Facility: CLINIC | Age: 38
End: 2023-03-16
Payer: COMMERCIAL

## 2023-03-16 VITALS
HEIGHT: 69 IN | SYSTOLIC BLOOD PRESSURE: 144 MMHG | RESPIRATION RATE: 18 BRPM | BODY MASS INDEX: 29.62 KG/M2 | HEART RATE: 87 BPM | WEIGHT: 200 LBS | OXYGEN SATURATION: 98 % | DIASTOLIC BLOOD PRESSURE: 83 MMHG

## 2023-03-16 DIAGNOSIS — E66.9 OBESITY, UNSPECIFIED CLASSIFICATION, UNSPECIFIED OBESITY TYPE, UNSPECIFIED WHETHER SERIOUS COMORBIDITY PRESENT: Chronic | ICD-10-CM

## 2023-03-16 DIAGNOSIS — F17.210 CIGARETTE NICOTINE DEPENDENCE WITHOUT COMPLICATION: Chronic | ICD-10-CM

## 2023-03-16 DIAGNOSIS — L30.9 HAND DERMATITIS: Chronic | ICD-10-CM

## 2023-03-16 DIAGNOSIS — Z82.49 FAMILY HISTORY OF EARLY CAD: ICD-10-CM

## 2023-03-16 DIAGNOSIS — E66.3 OVERWEIGHT (BMI 25.0-29.9): Chronic | ICD-10-CM

## 2023-03-16 DIAGNOSIS — E78.5 DYSLIPIDEMIA: Chronic | ICD-10-CM

## 2023-03-16 DIAGNOSIS — Z00.00 WELLNESS EXAMINATION: Primary | ICD-10-CM

## 2023-03-16 PROCEDURE — 3079F DIAST BP 80-89 MM HG: CPT | Mod: CPTII,,, | Performed by: GENERAL PRACTICE

## 2023-03-16 PROCEDURE — 3077F SYST BP >= 140 MM HG: CPT | Mod: CPTII,,, | Performed by: GENERAL PRACTICE

## 2023-03-16 PROCEDURE — 1160F RVW MEDS BY RX/DR IN RCRD: CPT | Mod: CPTII,,, | Performed by: GENERAL PRACTICE

## 2023-03-16 PROCEDURE — 3079F PR MOST RECENT DIASTOLIC BLOOD PRESSURE 80-89 MM HG: ICD-10-PCS | Mod: CPTII,,, | Performed by: GENERAL PRACTICE

## 2023-03-16 PROCEDURE — 99395 PR PREVENTIVE VISIT,EST,18-39: ICD-10-PCS | Mod: ,,, | Performed by: GENERAL PRACTICE

## 2023-03-16 PROCEDURE — 99395 PREV VISIT EST AGE 18-39: CPT | Mod: ,,, | Performed by: GENERAL PRACTICE

## 2023-03-16 PROCEDURE — 3008F PR BODY MASS INDEX (BMI) DOCUMENTED: ICD-10-PCS | Mod: CPTII,,, | Performed by: GENERAL PRACTICE

## 2023-03-16 PROCEDURE — 1159F PR MEDICATION LIST DOCUMENTED IN MEDICAL RECORD: ICD-10-PCS | Mod: CPTII,,, | Performed by: GENERAL PRACTICE

## 2023-03-16 PROCEDURE — 3008F BODY MASS INDEX DOCD: CPT | Mod: CPTII,,, | Performed by: GENERAL PRACTICE

## 2023-03-16 PROCEDURE — 3077F PR MOST RECENT SYSTOLIC BLOOD PRESSURE >= 140 MM HG: ICD-10-PCS | Mod: CPTII,,, | Performed by: GENERAL PRACTICE

## 2023-03-16 PROCEDURE — 1160F PR REVIEW ALL MEDS BY PRESCRIBER/CLIN PHARMACIST DOCUMENTED: ICD-10-PCS | Mod: CPTII,,, | Performed by: GENERAL PRACTICE

## 2023-03-16 PROCEDURE — 1159F MED LIST DOCD IN RCRD: CPT | Mod: CPTII,,, | Performed by: GENERAL PRACTICE

## 2023-03-16 RX ORDER — TRIAMCINOLONE ACETONIDE 1 MG/G
CREAM TOPICAL 2 TIMES DAILY
Qty: 30 G | Refills: 2 | Status: SHIPPED | OUTPATIENT
Start: 2023-03-16 | End: 2023-09-15 | Stop reason: SDUPTHER

## 2023-03-16 RX ORDER — VARENICLINE TARTRATE 1 MG/1
1 TABLET, FILM COATED ORAL 2 TIMES DAILY
Qty: 60 TABLET | Refills: 1 | Status: SHIPPED | OUTPATIENT
Start: 2023-03-16 | End: 2023-04-18 | Stop reason: SDUPTHER

## 2023-03-16 RX ORDER — VARENICLINE TARTRATE 0.5 (11)-1
KIT ORAL
Qty: 1 EACH | Refills: 0 | Status: SHIPPED | OUTPATIENT
Start: 2023-03-16 | End: 2023-06-16

## 2023-03-16 NOTE — PATIENT INSTRUCTIONS
Consume a diet low in saturated fats, (fats that are solid at room temperature such as animal fat) and trans fats, using healthy fats if necessary, olive oil and canola oil are 2 examples.  Increasing daily fiber intake can be very important in controlling cholesterol elevation as well.  Weight loss, especially weight loss associated with exercise can significantly lower lipid levels.  During future visits, depending on response to dietary changes, medication or change in dosage if already on lipid lowering medications may be considered if lipid levels continue to be significantly elevated.    After starting Chantix, there are 3 ways to quit smoking.  First way is to quit after taking medication for 1 week, some people find this method works best because there is a definite quit date chosen prior to starting Chantix.    Second ways to quit smoking during the 1st month of medication, this allows more time for the one to quit, but is still more aggressive than other ways, guaranteeing that as the medication becomes effective, quitting in the 1st month may be easier.    The 3rd way is to gradually quit smoking over the 3 months of taking the medication.  This is not aggressive but can be more successful if the patient would rather quit smoking more gradually.  However, this could be less successful if the patient ultimately does not quit by the end of the 3 month course of Chantix.    Regardless of the method, if 1 is motivated and dedicated quitting smoking, Chantix can be a very successful tool.

## 2023-03-16 NOTE — ASSESSMENT & PLAN NOTE
Patient does have hand dermatitis, not severe but more than likely something that he is coming into contact with at work.  I prescribed triamcinolone for him to use as needed, and he should watch his environment, hopefully avoiding anything that is causing this in the future.

## 2023-06-12 ENCOUNTER — CLINICAL SUPPORT (OUTPATIENT)
Dept: PRIMARY CARE CLINIC | Facility: CLINIC | Age: 38
End: 2023-06-12
Payer: COMMERCIAL

## 2023-06-12 DIAGNOSIS — F17.210 CIGARETTE NICOTINE DEPENDENCE WITHOUT COMPLICATION: Chronic | ICD-10-CM

## 2023-06-12 DIAGNOSIS — E78.00 ELEVATED CHOLESTEROL: Primary | ICD-10-CM

## 2023-06-12 LAB
CHOLEST SERPL-MCNC: 167 MG/DL
CHOLEST/HDLC SERPL: 5 {RATIO} (ref 0–5)
HDLC SERPL-MCNC: 32 MG/DL (ref 35–60)
LDLC SERPL CALC-MCNC: 92 MG/DL (ref 50–140)
TRIGL SERPL-MCNC: 217 MG/DL (ref 34–140)
VLDLC SERPL CALC-MCNC: 43 MG/DL

## 2023-06-12 PROCEDURE — 80061 LIPID PANEL: CPT | Performed by: GENERAL PRACTICE

## 2023-06-12 PROCEDURE — 36415 PR COLLECTION VENOUS BLOOD,VENIPUNCTURE: ICD-10-PCS | Mod: ,,, | Performed by: GENERAL PRACTICE

## 2023-06-12 PROCEDURE — 36415 COLL VENOUS BLD VENIPUNCTURE: CPT | Mod: ,,, | Performed by: GENERAL PRACTICE

## 2023-06-12 PROCEDURE — 36415 COLL VENOUS BLD VENIPUNCTURE: CPT

## 2023-06-12 NOTE — PROGRESS NOTES
Patient presented for nurse visit/Blood draw. 22g needle X 1 attempt in right antecubital.  Patient tolerated procedure well.

## 2023-06-15 NOTE — PROGRESS NOTES
"Subjective:       Patient ID: Wilman Cano is a 37 y.o. male.    Chief Complaint: Follow-up and Hyperlipidemia    Established patient, presents to the clinic for recheck, lifestyle modification for dyslipidemia, and smoking cessation, prescribed Chantix three months ago.  He has successfully quit smoking, S cholesterol numbers do look better.  However, his blood pressures from home show some signs of mild-to-moderate hypertension.  Again, he does have a significant family history of coronary artery disease, his dad he did have a diagnosis of CAD.    Review of Systems   Constitutional: Negative.  Negative for fatigue and fever.   HENT: Negative.  Negative for sore throat and trouble swallowing.    Eyes: Negative.  Negative for redness and visual disturbance.   Respiratory: Negative.  Negative for chest tightness and shortness of breath.    Cardiovascular: Negative.  Negative for chest pain.   Gastrointestinal: Negative.  Negative for abdominal pain, blood in stool and nausea.   Endocrine: Negative.  Negative for cold intolerance, heat intolerance and polyuria.   Genitourinary: Negative.    Musculoskeletal: Negative.  Negative for arthralgias, gait problem and joint swelling.   Integumentary:  Negative for rash. Negative.   Neurological: Negative.  Negative for dizziness, weakness and headaches.   Psychiatric/Behavioral: Negative.  Negative for agitation and dysphoric mood.        Objective:   Visit Vitals  /88   Pulse 88   Resp 18   Ht 5' 9" (1.753 m)   Wt 97.5 kg (215 lb)   SpO2 97%   BMI 31.75 kg/m²        Physical Exam  Constitutional:       Appearance: Normal appearance.   Eyes:      Conjunctiva/sclera: Conjunctivae normal.   Cardiovascular:      Rate and Rhythm: Normal rate and regular rhythm.   Pulmonary:      Effort: Pulmonary effort is normal.      Breath sounds: Normal breath sounds.   Skin:     General: Skin is warm and dry.   Neurological:      Mental Status: He is alert.   Psychiatric:         Mood " and Affect: Mood normal.         Behavior: Behavior normal.         Lab Results   Component Value Date     03/13/2023    K 5.0 03/13/2023    CO2 26 03/13/2023    BUN 8.1 (L) 03/13/2023    CREATININE 1.06 03/13/2023    CALCIUM 9.5 03/13/2023    ALBUMIN 4.2 03/13/2023    BILITOT 0.4 03/13/2023    ALKPHOS 72 03/13/2023    AST 32 03/13/2023    ALT 38 03/13/2023    EGFRNORACEVR >60 03/13/2023     Lab Results   Component Value Date    WBC 10.5 03/13/2023    RBC 5.44 03/13/2023    HGB 16.2 03/13/2023    HCT 47.4 03/13/2023    MCV 87.1 03/13/2023    RDW 13.0 03/13/2023     03/13/2023      Lab Results   Component Value Date    CHOL 167 06/12/2023    TRIG 217 (H) 06/12/2023    HDL 32 (L) 06/12/2023    LDL 92.00 06/12/2023    TOTALCHOLEST 5 06/12/2023     Lab Results   Component Value Date    TSH 1.906 03/13/2023     Lab Results   Component Value Date    HGBA1C 5.1 03/13/2023          Assessment:     Problem List Items Addressed This Visit          Cardiac/Vascular    Dyslipidemia - Primary (Chronic)    Current Assessment & Plan     Lipids look better, continue current management.    Component Ref Range & Units 3 d ago 3 mo ago 1 yr ago 2 yr ago 3 yr ago   Cholesterol Total <=200 mg/dL 167  155  194 R  165 R  168 R    HDL Cholesterol 35 - 60 mg/dL 32 Low   31 Low   34 R  29 Low   30 Low     Triglyceride 34 - 140 mg/dL 217 High   349 High   148 R  220 High   175 High  R    Cholesterol/HDL Ratio 0 - 5 5  5  6  6 High   5.6 High  R    Very Low Density Lipoprotein  43  70  30  44  35 R    LDL Cholesterol 50.00 - 140.00 mg/dL 92.00  54.00  130.00 R  92.00  103 R               Family history of early CAD (Chronic)    Current Assessment & Plan     We will continue to identify modify all risk factors.         Primary hypertension (Chronic)    Current Assessment & Plan     Start telmisartan 20 mg daily, continue blood pressure monitoring, watch for lightheadedness or dizziness, recheck in three months.         Relevant  Medications    telmisartan (MICARDIS) 20 MG Tab       GI    GERD without esophagitis (Chronic)    Current Assessment & Plan     Is having regular reflux, quitting smoking should help with that, GERD precautions discussed, I did prescribe pantoprazole 40 mg to use as needed.         Relevant Medications    pantoprazole (PROTONIX) 40 MG tablet       Other    Cigarette nicotine dependence in remission (Chronic)          Current Outpatient Medications   Medication Instructions    pantoprazole (PROTONIX) 40 mg, Oral, Daily    telmisartan (MICARDIS) 20 mg, Oral, Daily    triamcinolone acetonide 0.1% (KENALOG) 0.1 % cream Topical (Top), 2 times daily     Plan:             Follow up in about 3 months (around 9/16/2023) for HTN F/up.

## 2023-06-15 NOTE — ASSESSMENT & PLAN NOTE
Lipids look better, continue current management.    Component Ref Range & Units 3 d ago 3 mo ago 1 yr ago 2 yr ago 3 yr ago   Cholesterol Total <=200 mg/dL 167  155  194 R  165 R  168 R    HDL Cholesterol 35 - 60 mg/dL 32 Low   31 Low   34 R  29 Low   30 Low     Triglyceride 34 - 140 mg/dL 217 High   349 High   148 R  220 High   175 High  R    Cholesterol/HDL Ratio 0 - 5 5  5  6  6 High   5.6 High  R    Very Low Density Lipoprotein  43  70  30  44  35 R    LDL Cholesterol 50.00 - 140.00 mg/dL 92.00  54.00  130.00 R  92.00  103 R

## 2023-06-16 ENCOUNTER — OFFICE VISIT (OUTPATIENT)
Dept: PRIMARY CARE CLINIC | Facility: CLINIC | Age: 38
End: 2023-06-16
Payer: COMMERCIAL

## 2023-06-16 VITALS
DIASTOLIC BLOOD PRESSURE: 88 MMHG | BODY MASS INDEX: 31.84 KG/M2 | HEIGHT: 69 IN | HEART RATE: 88 BPM | SYSTOLIC BLOOD PRESSURE: 133 MMHG | WEIGHT: 215 LBS | OXYGEN SATURATION: 97 % | RESPIRATION RATE: 18 BRPM

## 2023-06-16 DIAGNOSIS — Z82.49 FAMILY HISTORY OF EARLY CAD: Chronic | ICD-10-CM

## 2023-06-16 DIAGNOSIS — I10 PRIMARY HYPERTENSION: Chronic | ICD-10-CM

## 2023-06-16 DIAGNOSIS — K21.9 GERD WITHOUT ESOPHAGITIS: ICD-10-CM

## 2023-06-16 DIAGNOSIS — E78.5 DYSLIPIDEMIA: Primary | Chronic | ICD-10-CM

## 2023-06-16 DIAGNOSIS — F17.211 CIGARETTE NICOTINE DEPENDENCE IN REMISSION: Chronic | ICD-10-CM

## 2023-06-16 PROCEDURE — 1159F MED LIST DOCD IN RCRD: CPT | Mod: CPTII,,, | Performed by: GENERAL PRACTICE

## 2023-06-16 PROCEDURE — 1159F PR MEDICATION LIST DOCUMENTED IN MEDICAL RECORD: ICD-10-PCS | Mod: CPTII,,, | Performed by: GENERAL PRACTICE

## 2023-06-16 PROCEDURE — 1160F PR REVIEW ALL MEDS BY PRESCRIBER/CLIN PHARMACIST DOCUMENTED: ICD-10-PCS | Mod: CPTII,,, | Performed by: GENERAL PRACTICE

## 2023-06-16 PROCEDURE — 1160F RVW MEDS BY RX/DR IN RCRD: CPT | Mod: CPTII,,, | Performed by: GENERAL PRACTICE

## 2023-06-16 PROCEDURE — 3079F DIAST BP 80-89 MM HG: CPT | Mod: CPTII,,, | Performed by: GENERAL PRACTICE

## 2023-06-16 PROCEDURE — 3079F PR MOST RECENT DIASTOLIC BLOOD PRESSURE 80-89 MM HG: ICD-10-PCS | Mod: CPTII,,, | Performed by: GENERAL PRACTICE

## 2023-06-16 PROCEDURE — 3075F SYST BP GE 130 - 139MM HG: CPT | Mod: CPTII,,, | Performed by: GENERAL PRACTICE

## 2023-06-16 PROCEDURE — 3008F PR BODY MASS INDEX (BMI) DOCUMENTED: ICD-10-PCS | Mod: CPTII,,, | Performed by: GENERAL PRACTICE

## 2023-06-16 PROCEDURE — 3075F PR MOST RECENT SYSTOLIC BLOOD PRESS GE 130-139MM HG: ICD-10-PCS | Mod: CPTII,,, | Performed by: GENERAL PRACTICE

## 2023-06-16 PROCEDURE — 99214 PR OFFICE/OUTPT VISIT, EST, LEVL IV, 30-39 MIN: ICD-10-PCS | Mod: ,,, | Performed by: GENERAL PRACTICE

## 2023-06-16 PROCEDURE — 99214 OFFICE O/P EST MOD 30 MIN: CPT | Mod: ,,, | Performed by: GENERAL PRACTICE

## 2023-06-16 PROCEDURE — 3008F BODY MASS INDEX DOCD: CPT | Mod: CPTII,,, | Performed by: GENERAL PRACTICE

## 2023-06-16 RX ORDER — PANTOPRAZOLE SODIUM 40 MG/1
40 TABLET, DELAYED RELEASE ORAL DAILY
Qty: 30 TABLET | Refills: 11 | Status: SHIPPED | OUTPATIENT
Start: 2023-06-16 | End: 2024-06-15

## 2023-06-16 RX ORDER — TELMISARTAN 20 MG/1
20 TABLET ORAL DAILY
Qty: 30 TABLET | Refills: 11 | Status: SHIPPED | OUTPATIENT
Start: 2023-06-16 | End: 2023-09-15 | Stop reason: SDUPTHER

## 2023-06-16 NOTE — ASSESSMENT & PLAN NOTE
Start telmisartan 20 mg daily, continue blood pressure monitoring, watch for lightheadedness or dizziness, recheck in three months.

## 2023-06-16 NOTE — ASSESSMENT & PLAN NOTE
Is having regular reflux, quitting smoking should help with that, GERD precautions discussed, I did prescribe pantoprazole 40 mg to use as needed.

## 2023-09-13 NOTE — PROGRESS NOTES
"Subjective:       Patient ID: Wilman Cano is a 37 y.o. male.    Chief Complaint: Hypertension and Follow-up    Established patient, presents to the clinic for HTN recheck after starting telmisartan 20 mg daily three months ago.      Review of Systems   Constitutional: Negative.  Negative for fatigue and fever.   Respiratory: Negative.  Negative for shortness of breath.    Cardiovascular: Negative.  Negative for chest pain.   Gastrointestinal: Negative.  Negative for abdominal pain, change in bowel habit and change in bowel habit.   Integumentary:  Negative.   Neurological:  Negative for weakness.         Objective:     Vitals:    09/15/23 0851   BP: 135/84   Pulse: 86   Resp: 18   SpO2: 98%   Weight: 103.9 kg (229 lb)   Height: 5' 9" (1.753 m)   Body mass index is 33.82 kg/m².     Physical Exam  Constitutional:       Appearance: Normal appearance.   Eyes:      Conjunctiva/sclera: Conjunctivae normal.   Cardiovascular:      Rate and Rhythm: Normal rate and regular rhythm.   Pulmonary:      Effort: Pulmonary effort is normal.      Breath sounds: Normal breath sounds.   Skin:     General: Skin is warm and dry.   Neurological:      Mental Status: He is alert.   Psychiatric:         Mood and Affect: Mood normal.         Behavior: Behavior normal.           Assessment:     Problem List Items Addressed This Visit          Derm    Hand dermatitis (Chronic)    Relevant Medications    triamcinolone acetonide 0.1% (KENALOG) 0.1 % cream       Cardiac/Vascular    Primary hypertension - Primary (Chronic)    Overview     Telmisartan 40 mg daily, dose increased on 09/15/2023.         Current Assessment & Plan     Start telmisartan 40 mg daily, continue blood pressure monitoring.         Relevant Medications    telmisartan (MICARDIS) 40 MG Tab          Current Outpatient Medications   Medication Instructions    gabapentin (NEURONTIN) 100 mg, Oral, 3 times daily    pantoprazole (PROTONIX) 40 mg, Oral, Daily    telmisartan (MICARDIS) " 40 mg, Oral, Daily    triamcinolone acetonide 0.1% (KENALOG) 0.1 % cream Topical (Top), 2 times daily     Plan:             Next follow-up is a wellness exam in March 2024.

## 2023-09-15 ENCOUNTER — OFFICE VISIT (OUTPATIENT)
Dept: PRIMARY CARE CLINIC | Facility: CLINIC | Age: 38
End: 2023-09-15
Payer: COMMERCIAL

## 2023-09-15 VITALS
WEIGHT: 229 LBS | SYSTOLIC BLOOD PRESSURE: 135 MMHG | DIASTOLIC BLOOD PRESSURE: 84 MMHG | BODY MASS INDEX: 33.92 KG/M2 | HEIGHT: 69 IN | RESPIRATION RATE: 18 BRPM | OXYGEN SATURATION: 98 % | HEART RATE: 86 BPM

## 2023-09-15 DIAGNOSIS — I10 PRIMARY HYPERTENSION: Primary | Chronic | ICD-10-CM

## 2023-09-15 DIAGNOSIS — L30.9 HAND DERMATITIS: Chronic | ICD-10-CM

## 2023-09-15 PROBLEM — F17.211 CIGARETTE NICOTINE DEPENDENCE IN REMISSION: Chronic | Status: RESOLVED | Noted: 2023-03-15 | Resolved: 2023-09-15

## 2023-09-15 PROCEDURE — 4010F PR ACE/ARB THEARPY RXD/TAKEN: ICD-10-PCS | Mod: CPTII,,, | Performed by: GENERAL PRACTICE

## 2023-09-15 PROCEDURE — 3075F PR MOST RECENT SYSTOLIC BLOOD PRESS GE 130-139MM HG: ICD-10-PCS | Mod: CPTII,,, | Performed by: GENERAL PRACTICE

## 2023-09-15 PROCEDURE — 3008F PR BODY MASS INDEX (BMI) DOCUMENTED: ICD-10-PCS | Mod: CPTII,,, | Performed by: GENERAL PRACTICE

## 2023-09-15 PROCEDURE — 4010F ACE/ARB THERAPY RXD/TAKEN: CPT | Mod: CPTII,,, | Performed by: GENERAL PRACTICE

## 2023-09-15 PROCEDURE — 1160F PR REVIEW ALL MEDS BY PRESCRIBER/CLIN PHARMACIST DOCUMENTED: ICD-10-PCS | Mod: CPTII,,, | Performed by: GENERAL PRACTICE

## 2023-09-15 PROCEDURE — 3044F PR MOST RECENT HEMOGLOBIN A1C LEVEL <7.0%: ICD-10-PCS | Mod: CPTII,,, | Performed by: GENERAL PRACTICE

## 2023-09-15 PROCEDURE — 1159F MED LIST DOCD IN RCRD: CPT | Mod: CPTII,,, | Performed by: GENERAL PRACTICE

## 2023-09-15 PROCEDURE — 1159F PR MEDICATION LIST DOCUMENTED IN MEDICAL RECORD: ICD-10-PCS | Mod: CPTII,,, | Performed by: GENERAL PRACTICE

## 2023-09-15 PROCEDURE — 3044F HG A1C LEVEL LT 7.0%: CPT | Mod: CPTII,,, | Performed by: GENERAL PRACTICE

## 2023-09-15 PROCEDURE — 3079F DIAST BP 80-89 MM HG: CPT | Mod: CPTII,,, | Performed by: GENERAL PRACTICE

## 2023-09-15 PROCEDURE — 99213 OFFICE O/P EST LOW 20 MIN: CPT | Mod: ,,, | Performed by: GENERAL PRACTICE

## 2023-09-15 PROCEDURE — 3075F SYST BP GE 130 - 139MM HG: CPT | Mod: CPTII,,, | Performed by: GENERAL PRACTICE

## 2023-09-15 PROCEDURE — 3008F BODY MASS INDEX DOCD: CPT | Mod: CPTII,,, | Performed by: GENERAL PRACTICE

## 2023-09-15 PROCEDURE — 99213 PR OFFICE/OUTPT VISIT, EST, LEVL III, 20-29 MIN: ICD-10-PCS | Mod: ,,, | Performed by: GENERAL PRACTICE

## 2023-09-15 PROCEDURE — 3079F PR MOST RECENT DIASTOLIC BLOOD PRESSURE 80-89 MM HG: ICD-10-PCS | Mod: CPTII,,, | Performed by: GENERAL PRACTICE

## 2023-09-15 PROCEDURE — 1160F RVW MEDS BY RX/DR IN RCRD: CPT | Mod: CPTII,,, | Performed by: GENERAL PRACTICE

## 2023-09-15 RX ORDER — TRIAMCINOLONE ACETONIDE 1 MG/G
CREAM TOPICAL 2 TIMES DAILY
Qty: 30 G | Refills: 2 | Status: SHIPPED | OUTPATIENT
Start: 2023-09-15 | End: 2024-02-08

## 2023-09-15 RX ORDER — TELMISARTAN 40 MG/1
40 TABLET ORAL DAILY
Qty: 90 TABLET | Refills: 3 | Status: SHIPPED | OUTPATIENT
Start: 2023-09-15 | End: 2024-03-21

## 2023-09-15 RX ORDER — GABAPENTIN 100 MG/1
100 CAPSULE ORAL 3 TIMES DAILY
COMMUNITY
Start: 2023-08-26 | End: 2024-02-08

## 2024-02-07 NOTE — PROGRESS NOTES
"Subjective:       Patient ID: Wilman Cano is a 38 y.o. male.    Chief Complaint: Rash    Established patient, presents to the clinic with a dermatological complaint.  History of hand dermatitis, seen previously, prescribe triamcinolone cream.  Worked somewhat, but rash has recurred, now worse, more widespread, now on elbows, knees.      Review of Systems   Constitutional: Negative.  Negative for fatigue and fever.   Respiratory: Negative.  Negative for shortness of breath.    Cardiovascular: Negative.  Negative for chest pain.   Gastrointestinal: Negative.  Negative for abdominal pain and change in bowel habit.   Integumentary:  Positive for rash.   Neurological:  Negative for weakness.           Patient Care Team:  Alfonso Gardner MD as PCP - General (Family Medicine)  Objective:     Vitals:    02/08/24 1332   BP: 132/83   Pulse: 99   Resp: 18   SpO2: 99%   Weight: 105.2 kg (232 lb)   Height: 5' 9" (1.753 m)   Body mass index is 34.26 kg/m².     Physical Exam  Constitutional:       Appearance: Normal appearance.   Eyes:      Conjunctiva/sclera: Conjunctivae normal.   Cardiovascular:      Rate and Rhythm: Normal rate.   Pulmonary:      Effort: Pulmonary effort is normal.   Skin:     General: Skin is warm and dry.      Comments: Hypertrophic rash, mostly dorsum of hands, elbows, someone these.  No signs of secondary infection.   Neurological:      Mental Status: He is alert.           Assessment:       ICD-10-CM ICD-9-CM   1. Hand dermatitis  L30.9 692.9       Current Outpatient Medications   Medication Instructions    betamethasone dipropionate (DIPROLENE) 0.05 % ointment Topical (Top), 2 times daily    betamethasone dipropionate 0.05 % cream Topical (Top), 2 times daily    pantoprazole (PROTONIX) 40 mg, Oral, Daily    predniSONE (DELTASONE) 20 mg, Oral, 2 times daily    telmisartan (MICARDIS) 40 mg, Oral, Daily     Plan:     Problem List Items Addressed This Visit          Derm    Hand dermatitis - Primary " (Chronic)    Overview     Significant recurrent hand dermatitis.    Oral prednisone, tapered dose with refills.  Diprolene gel for most significant symptomatic rash.  Diprolene cream for moderate rash.  Triamcinolone cream for maintenance and/or milder rash.  Try to avoid irritant chemicals on hands.         Relevant Medications    betamethasone dipropionate 0.05 % cream    betamethasone dipropionate (DIPROLENE) 0.05 % ointment    predniSONE (DELTASONE) 20 MG tablet               Follow up for next appointment as scheduled or as needed.

## 2024-02-08 ENCOUNTER — OFFICE VISIT (OUTPATIENT)
Dept: PRIMARY CARE CLINIC | Facility: CLINIC | Age: 39
End: 2024-02-08
Payer: COMMERCIAL

## 2024-02-08 VITALS
OXYGEN SATURATION: 99 % | WEIGHT: 232 LBS | SYSTOLIC BLOOD PRESSURE: 132 MMHG | HEIGHT: 69 IN | HEART RATE: 99 BPM | RESPIRATION RATE: 18 BRPM | DIASTOLIC BLOOD PRESSURE: 83 MMHG | BODY MASS INDEX: 34.36 KG/M2

## 2024-02-08 DIAGNOSIS — L30.9 HAND DERMATITIS: Primary | Chronic | ICD-10-CM

## 2024-02-08 PROCEDURE — 1160F RVW MEDS BY RX/DR IN RCRD: CPT | Mod: CPTII,,, | Performed by: GENERAL PRACTICE

## 2024-02-08 PROCEDURE — 99213 OFFICE O/P EST LOW 20 MIN: CPT | Mod: ,,, | Performed by: GENERAL PRACTICE

## 2024-02-08 PROCEDURE — 1159F MED LIST DOCD IN RCRD: CPT | Mod: CPTII,,, | Performed by: GENERAL PRACTICE

## 2024-02-08 PROCEDURE — 3079F DIAST BP 80-89 MM HG: CPT | Mod: CPTII,,, | Performed by: GENERAL PRACTICE

## 2024-02-08 PROCEDURE — 3008F BODY MASS INDEX DOCD: CPT | Mod: CPTII,,, | Performed by: GENERAL PRACTICE

## 2024-02-08 PROCEDURE — 3075F SYST BP GE 130 - 139MM HG: CPT | Mod: CPTII,,, | Performed by: GENERAL PRACTICE

## 2024-02-08 RX ORDER — BETAMETHASONE DIPROPIONATE 0.5 MG/G
OINTMENT TOPICAL 2 TIMES DAILY
Qty: 45 G | Refills: 3 | Status: SHIPPED | OUTPATIENT
Start: 2024-02-08 | End: 2024-05-08

## 2024-02-08 RX ORDER — PREDNISONE 20 MG/1
20 TABLET ORAL 2 TIMES DAILY
Qty: 20 TABLET | Refills: 2 | Status: SHIPPED | OUTPATIENT
Start: 2024-02-08 | End: 2024-03-09

## 2024-02-08 RX ORDER — BETAMETHASONE DIPROPIONATE 0.5 MG/G
CREAM TOPICAL 2 TIMES DAILY
Qty: 45 G | Refills: 3 | Status: SHIPPED | OUTPATIENT
Start: 2024-02-08 | End: 2024-05-08

## 2024-03-18 ENCOUNTER — CLINICAL SUPPORT (OUTPATIENT)
Dept: PRIMARY CARE CLINIC | Facility: CLINIC | Age: 39
End: 2024-03-18
Payer: COMMERCIAL

## 2024-03-18 DIAGNOSIS — E78.5 DYSLIPIDEMIA: Chronic | ICD-10-CM

## 2024-03-18 DIAGNOSIS — Z00.00 WELLNESS EXAMINATION: ICD-10-CM

## 2024-03-18 LAB
ALBUMIN SERPL-MCNC: 4.1 G/DL (ref 3.5–5)
ALBUMIN/GLOB SERPL: 1.6 RATIO (ref 1.1–2)
ALP SERPL-CCNC: 78 UNIT/L (ref 40–150)
ALT SERPL-CCNC: 79 UNIT/L (ref 0–55)
AST SERPL-CCNC: 41 UNIT/L (ref 5–34)
BASOPHILS # BLD AUTO: 0.06 X10(3)/MCL
BASOPHILS NFR BLD AUTO: 1 %
BILIRUB SERPL-MCNC: 0.6 MG/DL
BUN SERPL-MCNC: 11.6 MG/DL (ref 8.9–20.6)
CALCIUM SERPL-MCNC: 9.1 MG/DL (ref 8.4–10.2)
CHLORIDE SERPL-SCNC: 107 MMOL/L (ref 98–107)
CHOLEST SERPL-MCNC: 161 MG/DL
CHOLEST/HDLC SERPL: 6 {RATIO} (ref 0–5)
CO2 SERPL-SCNC: 23 MMOL/L (ref 22–29)
CREAT SERPL-MCNC: 1.07 MG/DL (ref 0.73–1.18)
EOSINOPHIL # BLD AUTO: 0.14 X10(3)/MCL (ref 0–0.9)
EOSINOPHIL NFR BLD AUTO: 2.4 %
ERYTHROCYTE [DISTWIDTH] IN BLOOD BY AUTOMATED COUNT: 13.3 % (ref 11.5–17)
EST. AVERAGE GLUCOSE BLD GHB EST-MCNC: 174.3 MG/DL
GFR SERPLBLD CREATININE-BSD FMLA CKD-EPI: >60 MLS/MIN/1.73/M2
GLOBULIN SER-MCNC: 2.6 GM/DL (ref 2.4–3.5)
GLUCOSE SERPL-MCNC: 180 MG/DL (ref 74–100)
HBA1C MFR BLD: 7.7 %
HCT VFR BLD AUTO: 41.4 % (ref 42–52)
HDLC SERPL-MCNC: 29 MG/DL (ref 35–60)
HGB BLD-MCNC: 14.1 G/DL (ref 14–18)
IMM GRANULOCYTES # BLD AUTO: 0.21 X10(3)/MCL (ref 0–0.04)
IMM GRANULOCYTES NFR BLD AUTO: 3.6 %
LDLC SERPL CALC-MCNC: 93 MG/DL (ref 50–140)
LYMPHOCYTES # BLD AUTO: 1.48 X10(3)/MCL (ref 0.6–4.6)
LYMPHOCYTES NFR BLD AUTO: 25.7 %
MCH RBC QN AUTO: 29.4 PG (ref 27–31)
MCHC RBC AUTO-ENTMCNC: 34.1 G/DL (ref 33–36)
MCV RBC AUTO: 86.3 FL (ref 80–94)
MONOCYTES # BLD AUTO: 0.94 X10(3)/MCL (ref 0.1–1.3)
MONOCYTES NFR BLD AUTO: 16.3 %
NEUTROPHILS # BLD AUTO: 2.93 X10(3)/MCL (ref 2.1–9.2)
NEUTROPHILS NFR BLD AUTO: 51 %
NRBC BLD AUTO-RTO: 0 %
PLATELET # BLD AUTO: 184 X10(3)/MCL (ref 130–400)
PMV BLD AUTO: 11.7 FL (ref 7.4–10.4)
POTASSIUM SERPL-SCNC: 4.1 MMOL/L (ref 3.5–5.1)
PROT SERPL-MCNC: 6.7 GM/DL (ref 6.4–8.3)
RBC # BLD AUTO: 4.8 X10(6)/MCL (ref 4.7–6.1)
SODIUM SERPL-SCNC: 141 MMOL/L (ref 136–145)
TRIGL SERPL-MCNC: 197 MG/DL (ref 34–140)
TSH SERPL-ACNC: 1.41 UIU/ML (ref 0.35–4.94)
VLDLC SERPL CALC-MCNC: 39 MG/DL
WBC # SPEC AUTO: 5.76 X10(3)/MCL (ref 4.5–11.5)

## 2024-03-18 PROCEDURE — 83036 HEMOGLOBIN GLYCOSYLATED A1C: CPT | Performed by: GENERAL PRACTICE

## 2024-03-18 PROCEDURE — 80053 COMPREHEN METABOLIC PANEL: CPT | Performed by: GENERAL PRACTICE

## 2024-03-18 PROCEDURE — 80061 LIPID PANEL: CPT | Performed by: GENERAL PRACTICE

## 2024-03-18 PROCEDURE — 36415 COLL VENOUS BLD VENIPUNCTURE: CPT | Mod: ,,, | Performed by: GENERAL PRACTICE

## 2024-03-18 PROCEDURE — 85025 COMPLETE CBC W/AUTO DIFF WBC: CPT | Performed by: GENERAL PRACTICE

## 2024-03-18 PROCEDURE — 36415 COLL VENOUS BLD VENIPUNCTURE: CPT

## 2024-03-18 PROCEDURE — 84443 ASSAY THYROID STIM HORMONE: CPT | Performed by: GENERAL PRACTICE

## 2024-03-18 NOTE — PROGRESS NOTES
Patient verified name and .Patient here for nurse visit to draw AW/ A1C/ TSH labs with (22- 21) gauge needle. Patient was drawn in right/left antecubital .No complications or issues occurred. Patient tolerated venipuncture well. Patient expressed no questions or concerns. Mary JOSÉ LPN [facility]

## 2024-03-20 PROBLEM — E11.9 TYPE 2 DIABETES MELLITUS WITHOUT COMPLICATION, WITHOUT LONG-TERM CURRENT USE OF INSULIN: Chronic | Status: ACTIVE | Noted: 2024-03-20

## 2024-03-20 NOTE — ASSESSMENT & PLAN NOTE
Component Ref Range & Units 2 d ago 9 mo ago 1 yr ago 2 yr ago 3 yr ago 4 yr ago   Cholesterol Total <=200 mg/dL 161 167 155 194 R 165 R 168 R   HDL Cholesterol 35 - 60 mg/dL 29 Low  32 Low  31 Low  34 R 29 Low  30 Low    Triglyceride 34 - 140 mg/dL 197 High  217 High  349 High  148 R 220 High  175 High  R   Cholesterol/HDL Ratio 0 - 5 6 High  5 5 6 6 High  5.6 High  R   Very Low Density Lipoprotein  39 43 70 30 44 35 R   LDL Cholesterol 50.00 - 140.00 mg/dL 93.00 92.00 54.00 130.00 R 92.00 103 R

## 2024-03-20 NOTE — PROGRESS NOTES
"Subjective:       Patient ID: Wilman Cano is a 38 y.o. male.    Chief Complaint: No chief complaint on file.    Patient presents to the clinic today for wellness examination.  Current and past medical history reviewed.  Pertinent family and social history reviewed.    Vaccinations due:  Patient will receive any vaccinations that are currently due and desired.    No complaints today.  Current blood work shows newly diagnosed diabetes mellitus, persistent dyslipidemia.      Review of Systems   Constitutional: Negative.  Negative for fatigue and fever.   HENT: Negative.  Negative for sore throat and trouble swallowing.    Eyes: Negative.  Negative for redness and visual disturbance.   Respiratory: Negative.  Negative for chest tightness and shortness of breath.    Cardiovascular: Negative.  Negative for chest pain.   Gastrointestinal: Negative.  Negative for abdominal pain, blood in stool and nausea.   Endocrine: Negative.  Negative for cold intolerance, heat intolerance and polyuria.   Genitourinary: Negative.    Musculoskeletal: Negative.  Negative for arthralgias, gait problem and joint swelling.   Integumentary:  Negative for rash. Negative.   Neurological: Negative.  Negative for dizziness, weakness and headaches.   Psychiatric/Behavioral: Negative.  Negative for agitation and dysphoric mood.            Patient Care Team:  Alfonso Gardner MD as PCP - General (Family Medicine)  Objective:   Visit Vitals  /83   Pulse 98   Resp 18   Ht 5' 9" (1.753 m)   Wt 102.5 kg (226 lb)   SpO2 99%   BMI 33.37 kg/m²        Physical Exam  Constitutional:       Appearance: He is obese.   HENT:      Head: Normocephalic.      Mouth/Throat:      Mouth: Mucous membranes are moist.      Pharynx: Oropharynx is clear.   Eyes:      Pupils: Pupils are equal, round, and reactive to light.   Cardiovascular:      Rate and Rhythm: Normal rate and regular rhythm.   Pulmonary:      Effort: Pulmonary effort is normal.      Breath sounds: " "Normal breath sounds.   Abdominal:      Palpations: Abdomen is soft.   Musculoskeletal:         General: Normal range of motion.   Skin:     General: Skin is warm and dry.   Neurological:      General: No focal deficit present.      Mental Status: He is alert.   Psychiatric:         Mood and Affect: Mood normal.         Behavior: Behavior normal.         Thought Content: Thought content normal.         Judgment: Judgment normal.           Lab Results   Component Value Date     03/18/2024    K 4.1 03/18/2024    CO2 23 03/18/2024    BUN 11.6 03/18/2024    CREATININE 1.07 03/18/2024    CALCIUM 9.1 03/18/2024    ALBUMIN 4.1 03/18/2024    BILITOT 0.6 03/18/2024    ALKPHOS 78 03/18/2024    AST 41 (H) 03/18/2024    ALT 79 (H) 03/18/2024    EGFRNORACEVR >60 03/18/2024     Lab Results   Component Value Date    WBC 5.76 03/18/2024    RBC 4.80 03/18/2024    HGB 14.1 03/18/2024    HCT 41.4 (L) 03/18/2024    MCV 86.3 03/18/2024    RDW 13.3 03/18/2024     03/18/2024      Lab Results   Component Value Date    CHOL 161 03/18/2024    TRIG 197 (H) 03/18/2024    HDL 29 (L) 03/18/2024    LDL 93.00 03/18/2024    TOTALCHOLEST 6 (H) 03/18/2024     Lab Results   Component Value Date    TSH 1.409 03/18/2024     Lab Results   Component Value Date    HGBA1C 7.7 (H) 03/18/2024        No results found for: "PSA"      Assessment:       ICD-10-CM ICD-9-CM   1. Wellness examination  Z00.00 V70.0   2. Primary hypertension  I10 401.9   3. Type 2 diabetes mellitus with hyperglycemia, without long-term current use of insulin  E11.65 250.00     790.29   4. Dyslipidemia  E78.5 272.4   5. GERD without esophagitis  K21.9 530.81   6. Family history of early CAD  Z82.49 V17.3   7. Class 1 obesity due to excess calories with body mass index (BMI) of 33.0 to 33.9 in adult, unspecified whether serious comorbidity present  E66.09 278.00    Z68.33 V85.33       Current Outpatient Medications   Medication Instructions    betamethasone dipropionate " (DIPROLENE) 0.05 % ointment Topical (Top), 2 times daily    betamethasone dipropionate 0.05 % cream Topical (Top), 2 times daily    metFORMIN (GLUCOPHAGE) 500 mg, Oral, 2 times daily with meals    pantoprazole (PROTONIX) 40 mg, Oral, Daily    rosuvastatin (CRESTOR) 10 mg, Oral, Daily    telmisartan (MICARDIS) 80 mg, Oral, Daily    triamcinolone acetonide 0.1% (KENALOG) 0.1 % cream Topical (Top), 2 times daily     Plan:     Problem List Items Addressed This Visit          Cardiac/Vascular    Dyslipidemia (Chronic)    Overview     Prescribed Crestor 10 mg daily.    Started on 03/21/2024.    Patient instructed to consume a diet low in saturated fats, (fats that are solid at room temperature such as animal fat) and trans fats, using healthy fats if necessary, olive oil and canola oil are 2 examples.  Increasing daily fiber intake can be very important in controlling cholesterol elevation as well.  Weight loss, especially weight loss associated with exercise can significantly lower lipid levels.  During future visits, depending on response to dietary changes, medication or change in dosage if already on lipid lowering medications may be considered if lipid levels continue to be significantly elevated.         Current Assessment & Plan               Component Ref Range & Units 2 d ago 9 mo ago 1 yr ago 2 yr ago 3 yr ago 4 yr ago   Cholesterol Total <=200 mg/dL 161 167 155 194 R 165 R 168 R   HDL Cholesterol 35 - 60 mg/dL 29 Low  32 Low  31 Low  34 R 29 Low  30 Low    Triglyceride 34 - 140 mg/dL 197 High  217 High  349 High  148 R 220 High  175 High  R   Cholesterol/HDL Ratio 0 - 5 6 High  5 5 6 6 High  5.6 High  R   Very Low Density Lipoprotein  39 43 70 30 44 35 R   LDL Cholesterol 50.00 - 140.00 mg/dL 93.00 92.00 54.00 130.00 R 92.00 103 R             Relevant Medications    rosuvastatin (CRESTOR) 10 MG tablet    Family history of early CAD (Chronic)    Overview     Cardiovascular risk factors that have been addressed  "are as follows:  HTN, diabetes mellitus, dyslipidemia, obesity.         Primary hypertension (Chronic)    Overview     Telmisartan 80 mg daily.    Dose increased on 03/21/2024.    Blood pressures do appear to be controlled fairly well, although because of his coexisting diabetes mellitus I would like better control, we will increase his telmisartan.             Relevant Medications    telmisartan (MICARDIS) 80 MG Tab       Endocrine    Class 1 obesity due to excess calories with body mass index (BMI) of 33.0 to 33.9 in adult (Chronic)    Overview     Weight loss, healthy dietary choices, increased activity/exercise are recommended.  To lose weight, it is generally recommended to restrict calories to approximately 1500 calories per day to lose 1 lb per week, and approximately 1200 calories per day to lose up to 2 lb per week.  Generally, this is a healthy amount of weight to lose, and an appropriate amount of time to lose this amount of weight.  Adding exercise will assist in losing weight, particularly aerobic exercise such as walking.  However, resistance training, or lifting weights" over time may assistant weight loss by increasing basal metabolic rate, or the rate at which your body burns calories during periods of inactivity.    Keep track of BMI (body mass index), below 25 is considered normal, over 25 but below 30 is considered overweight, anything over 30 is considered moderately obese, over 35 severely obese, and over 40 is characterized as morbidly obese.         Type 2 diabetes mellitus with hyperglycemia, without long-term current use of insulin (Chronic)    Overview     Prescribed metformin 500 mg b.i.d..    Started on 03/21/2024.    Most recent A1c 7.7%, patient has become diabetic with an average blood sugar over the past 90 days of 174.3.         Current Assessment & Plan              Component Ref Range & Units 2 d ago 1 yr ago 2 yr ago 3 yr ago 4 yr ago   Hemoglobin A1c <=7.0 % 7.7 High  5.1 5.0 R " 5.0 R 5.0 R   Estimated Average Glucose mg/dL 174.3 99.7 96.8 R 96.8 97             Relevant Medications    metFORMIN (GLUCOPHAGE) 500 MG tablet    Other Relevant Orders    Hemoglobin A1C    Microalbumin/Creatinine Ratio, Urine       GI    GERD without esophagitis (Chronic)    Overview     Prescribed pantoprazole 40 mg daily p.r.n. reflux.    Symptoms of GERD have been modified and controlled using a PPI medication.  Patient was advised to continue reflux precautions as discussed in the past, and at some point would be prudent to discuss possible decreased frequency of PPI use or cessation of PPI use altogether.   It is hopeful at some point that the patient can use the medication on an as-needed basis.          Other Visit Diagnoses       Wellness examination    -  Primary    Overall health status was reviewed.  Good health habits reinforced.  Annual wellness exams recommended.                    Follow up in about 3 months (around 6/26/2024) for DM F/up.

## 2024-03-20 NOTE — ASSESSMENT & PLAN NOTE
Component Ref Range & Units 2 d ago 1 yr ago 2 yr ago 3 yr ago 4 yr ago   Hemoglobin A1c <=7.0 % 7.7 High  5.1 5.0 R 5.0 R 5.0 R   Estimated Average Glucose mg/dL 174.3 99.7 96.8 R 96.8 97

## 2024-03-21 ENCOUNTER — OFFICE VISIT (OUTPATIENT)
Dept: PRIMARY CARE CLINIC | Facility: CLINIC | Age: 39
End: 2024-03-21
Payer: COMMERCIAL

## 2024-03-21 VITALS
BODY MASS INDEX: 33.47 KG/M2 | SYSTOLIC BLOOD PRESSURE: 133 MMHG | OXYGEN SATURATION: 99 % | RESPIRATION RATE: 18 BRPM | HEIGHT: 69 IN | HEART RATE: 98 BPM | WEIGHT: 226 LBS | DIASTOLIC BLOOD PRESSURE: 83 MMHG

## 2024-03-21 DIAGNOSIS — Z00.00 WELLNESS EXAMINATION: Primary | ICD-10-CM

## 2024-03-21 DIAGNOSIS — E66.09 CLASS 1 OBESITY DUE TO EXCESS CALORIES WITH BODY MASS INDEX (BMI) OF 33.0 TO 33.9 IN ADULT, UNSPECIFIED WHETHER SERIOUS COMORBIDITY PRESENT: ICD-10-CM

## 2024-03-21 DIAGNOSIS — E78.5 DYSLIPIDEMIA: Chronic | ICD-10-CM

## 2024-03-21 DIAGNOSIS — Z82.49 FAMILY HISTORY OF EARLY CAD: Chronic | ICD-10-CM

## 2024-03-21 DIAGNOSIS — K21.9 GERD WITHOUT ESOPHAGITIS: Chronic | ICD-10-CM

## 2024-03-21 DIAGNOSIS — E11.65 TYPE 2 DIABETES MELLITUS WITH HYPERGLYCEMIA, WITHOUT LONG-TERM CURRENT USE OF INSULIN: Chronic | ICD-10-CM

## 2024-03-21 DIAGNOSIS — I10 PRIMARY HYPERTENSION: Chronic | ICD-10-CM

## 2024-03-21 PROBLEM — E66.811 CLASS 1 OBESITY DUE TO EXCESS CALORIES WITH BODY MASS INDEX (BMI) OF 33.0 TO 33.9 IN ADULT: Chronic | Status: ACTIVE | Noted: 2023-03-15

## 2024-03-21 PROBLEM — E66.811 CLASS 1 OBESITY DUE TO EXCESS CALORIES WITH BODY MASS INDEX (BMI) OF 33.0 TO 33.9 IN ADULT: Status: ACTIVE | Noted: 2023-03-15

## 2024-03-21 PROCEDURE — 1159F MED LIST DOCD IN RCRD: CPT | Mod: CPTII,,, | Performed by: GENERAL PRACTICE

## 2024-03-21 PROCEDURE — 3075F SYST BP GE 130 - 139MM HG: CPT | Mod: CPTII,,, | Performed by: GENERAL PRACTICE

## 2024-03-21 PROCEDURE — 3079F DIAST BP 80-89 MM HG: CPT | Mod: CPTII,,, | Performed by: GENERAL PRACTICE

## 2024-03-21 PROCEDURE — 1160F RVW MEDS BY RX/DR IN RCRD: CPT | Mod: CPTII,,, | Performed by: GENERAL PRACTICE

## 2024-03-21 PROCEDURE — 3051F HG A1C>EQUAL 7.0%<8.0%: CPT | Mod: CPTII,,, | Performed by: GENERAL PRACTICE

## 2024-03-21 PROCEDURE — 3008F BODY MASS INDEX DOCD: CPT | Mod: CPTII,,, | Performed by: GENERAL PRACTICE

## 2024-03-21 PROCEDURE — 99395 PREV VISIT EST AGE 18-39: CPT | Mod: ,,, | Performed by: GENERAL PRACTICE

## 2024-03-21 RX ORDER — METFORMIN HYDROCHLORIDE 500 MG/1
500 TABLET ORAL 2 TIMES DAILY WITH MEALS
Qty: 180 TABLET | Refills: 3 | Status: SHIPPED | OUTPATIENT
Start: 2024-03-21 | End: 2025-03-21

## 2024-03-21 RX ORDER — ROSUVASTATIN CALCIUM 10 MG/1
10 TABLET, COATED ORAL DAILY
Qty: 90 TABLET | Refills: 3 | Status: SHIPPED | OUTPATIENT
Start: 2024-03-21 | End: 2025-03-21

## 2024-03-21 RX ORDER — TRIAMCINOLONE ACETONIDE 1 MG/G
CREAM TOPICAL 2 TIMES DAILY
COMMUNITY
Start: 2024-02-09

## 2024-03-21 RX ORDER — TELMISARTAN 80 MG/1
80 TABLET ORAL DAILY
Qty: 90 TABLET | Refills: 3 | Status: SHIPPED | OUTPATIENT
Start: 2024-03-21 | End: 2025-03-21

## 2024-06-27 ENCOUNTER — CLINICAL SUPPORT (OUTPATIENT)
Dept: PRIMARY CARE CLINIC | Facility: CLINIC | Age: 39
End: 2024-06-27
Payer: COMMERCIAL

## 2024-06-27 DIAGNOSIS — E11.65 TYPE 2 DIABETES MELLITUS WITH HYPERGLYCEMIA, WITHOUT LONG-TERM CURRENT USE OF INSULIN: Chronic | ICD-10-CM

## 2024-06-27 DIAGNOSIS — E11.65 TYPE 2 DIABETES MELLITUS WITH HYPERGLYCEMIA, WITHOUT LONG-TERM CURRENT USE OF INSULIN: Primary | Chronic | ICD-10-CM

## 2024-06-27 LAB
CREAT UR-MCNC: 261.5 MG/DL (ref 63–166)
EST. AVERAGE GLUCOSE BLD GHB EST-MCNC: 119.8 MG/DL
HBA1C MFR BLD: 5.8 %
MICROALBUMIN UR-MCNC: 27.3 UG/ML
MICROALBUMIN/CREAT RATIO PNL UR: 10.4 MG/GM CR (ref 0–30)

## 2024-06-27 PROCEDURE — 82570 ASSAY OF URINE CREATININE: CPT | Performed by: GENERAL PRACTICE

## 2024-06-27 PROCEDURE — 36415 COLL VENOUS BLD VENIPUNCTURE: CPT | Mod: ,,, | Performed by: GENERAL PRACTICE

## 2024-06-27 PROCEDURE — 36415 COLL VENOUS BLD VENIPUNCTURE: CPT

## 2024-06-27 PROCEDURE — 83036 HEMOGLOBIN GLYCOSYLATED A1C: CPT | Performed by: GENERAL PRACTICE

## 2024-06-27 RX ORDER — BETAMETHASONE DIPROPIONATE 0.5 MG/G
CREAM TOPICAL 2 TIMES DAILY
COMMUNITY
Start: 2024-02-09

## 2024-06-27 NOTE — PROGRESS NOTES
"Subjective:       Patient ID: Wilman Cano is a 38 y.o. male.    Chief Complaint: Diabetes and Follow-up    Established patient, returns for diabetes recheck.  Diagnosed with diabetes three months ago with an A1c of 7.7%.  Started on metformin, current A1c now 5.8%.  Had some stomach upset with twice a day metformin, taking only 500 mg daily.  He has changed his diet.  He was also started on Crestor, and telmisartan.  Blood pressures look good, no significant problems today.      Review of Systems   Constitutional: Negative.  Negative for fatigue and fever.   HENT: Negative.  Negative for sore throat and trouble swallowing.    Eyes: Negative.  Negative for redness and visual disturbance.   Respiratory: Negative.  Negative for chest tightness and shortness of breath.    Cardiovascular: Negative.  Negative for chest pain.   Gastrointestinal: Negative.  Negative for abdominal pain, blood in stool and nausea.   Endocrine: Negative.  Negative for cold intolerance, heat intolerance and polyuria.   Genitourinary: Negative.    Musculoskeletal: Negative.  Negative for arthralgias, gait problem and joint swelling.   Integumentary:  Negative for rash. Negative.   Neurological: Negative.  Negative for dizziness, weakness and headaches.   Psychiatric/Behavioral: Negative.  Negative for agitation and dysphoric mood.            Patient Care Team:  Alfonso Gardner MD as PCP - General (Family Medicine)  Objective:   Visit Vitals  /85   Pulse 87   Resp 18   Ht 5' 9" (1.753 m)   Wt 98.9 kg (218 lb)   SpO2 99%   BMI 32.19 kg/m²        Physical Exam  Constitutional:       Appearance: Normal appearance.   HENT:      Head: Normocephalic.      Mouth/Throat:      Mouth: Mucous membranes are moist.      Pharynx: Oropharynx is clear.   Eyes:      Conjunctiva/sclera: Conjunctivae normal.      Pupils: Pupils are equal, round, and reactive to light.   Cardiovascular:      Rate and Rhythm: Normal rate and regular rhythm.      Heart sounds: " Normal heart sounds.   Pulmonary:      Effort: Pulmonary effort is normal.      Breath sounds: Normal breath sounds.   Abdominal:      General: Abdomen is flat.      Palpations: Abdomen is soft.   Musculoskeletal:         General: Normal range of motion.      Cervical back: Neck supple.   Skin:     General: Skin is warm and dry.   Neurological:      General: No focal deficit present.      Mental Status: He is alert and oriented to person, place, and time.   Psychiatric:         Mood and Affect: Mood normal.         Behavior: Behavior normal.         Thought Content: Thought content normal.         Judgment: Judgment normal.       Diabetic foot exam:  Examination performed by myself, Dr. Gardner    Examination of the skin/nails:  Bilateral examination of the feet reveals no significant skin lesions/abnormalities, ulcerations, ulcers, nail abnormalities.  Patient does appear to be using appropriate footwear.    Peripheral pulses:  Dorsalis pedis pulse-normal bilaterally  Posterior tibialis pulse-normal bilaterally  Appropriate amount of foot hair, capillary refill normal    Five point sensation test:  Left foot sensation test-no significant loss of sensation at all points tested  Right foot to cessation test-no significant loss of sensation at all points tested    Foot exam risk category:  0-2 (none or minimal loss of protective sensation with no additional findings)    Foot exam intervention:  Foot care education done at appropriate risk level      Lab Results   Component Value Date     03/18/2024    K 4.1 03/18/2024     03/18/2024    CO2 23 03/18/2024    BUN 11.6 03/18/2024    CREATININE 1.07 03/18/2024    CALCIUM 9.1 03/18/2024    ALBUMIN 4.1 03/18/2024    BILITOT 0.6 03/18/2024    ALKPHOS 78 03/18/2024    AST 41 (H) 03/18/2024    ALT 79 (H) 03/18/2024    EGFRNORACEVR >60 03/18/2024     Lab Results   Component Value Date    WBC 5.76 03/18/2024    RBC 4.80 03/18/2024    HGB 14.1 03/18/2024    HCT 41.4 (L)  "03/18/2024    MCV 86.3 03/18/2024    RDW 13.3 03/18/2024     03/18/2024      Lab Results   Component Value Date    CHOL 161 03/18/2024    TRIG 197 (H) 03/18/2024    HDL 29 (L) 03/18/2024    LDL 93.00 03/18/2024    TOTALCHOLEST 6 (H) 03/18/2024     Lab Results   Component Value Date    TSH 1.409 03/18/2024     Lab Results   Component Value Date    HGBA1C 5.8 06/27/2024        No results found for: "PSA"      Assessment:       ICD-10-CM ICD-9-CM   1. Type 2 diabetes mellitus with hyperglycemia, without long-term current use of insulin  E11.65 250.00     790.29   2. GERD without esophagitis  K21.9 530.81   3. Dyslipidemia  E78.5 272.4       Current Outpatient Medications   Medication Instructions    betamethasone dipropionate 0.05 % cream Topical (Top), 2 times daily    metFORMIN (GLUCOPHAGE) 500 mg, Oral, 2 times daily with meals    pantoprazole (PROTONIX) 40 mg, Oral, Daily    rosuvastatin (CRESTOR) 10 mg, Oral, Daily    telmisartan (MICARDIS) 80 mg, Oral, Daily     Plan:     Problem List Items Addressed This Visit          Cardiac/Vascular    Dyslipidemia (Chronic)    Overview     Prescribed Crestor 10 mg daily.    Started on 03/21/2024.    Patient instructed to consume a diet low in saturated fats, (fats that are solid at room temperature such as animal fat) and trans fats, using healthy fats if necessary, olive oil and canola oil are 2 examples.  Increasing daily fiber intake can be very important in controlling cholesterol elevation as well.  Weight loss, especially weight loss associated with exercise can significantly lower lipid levels.  During future visits, depending on response to dietary changes, medication or change in dosage if already on lipid lowering medications may be considered if lipid levels continue to be significantly elevated.         Relevant Orders    Lipid Panel       Endocrine    Type 2 diabetes mellitus with hyperglycemia, without long-term current use of insulin - Primary (Chronic) "    Overview     Prescribed metformin 500 mg daily.    Initially prescribed twice a day, but it caused too much GI upset, so he has been taking it once per day.    Most recent hemoglobin A1c shows good control on diabetic treatment plan, including diabetic prescription medication.  Continue current treatment plan, medical regimen, and lifestyle modification as discussed at this visit and during previous visits.    Medication will be continued at current dosage.         Current Assessment & Plan               Component Ref Range & Units 07:17 3 mo ago 1 yr ago 2 yr ago 3 yr ago 4 yr ago   Hemoglobin A1c <=7.0 % 5.8 7.7 High  5.1 5.0 R 5.0 R 5.0 R   Estimated Average Glucose mg/dL 119.8 174.3 99.7 96.8 R 96.8 97               Relevant Orders    Hemoglobin A1C       GI    GERD without esophagitis (Chronic)    Overview     Prescribed pantoprazole 40 mg daily p.r.n. reflux.    Symptoms of GERD have been modified and controlled using a PPI medication.  Patient was advised to continue reflux precautions as discussed in the past, and at some point would be prudent to discuss possible decreased frequency of PPI use or cessation of PPI use altogether.   It is hopeful at some point that the patient can use the medication on an as-needed basis.         Relevant Medications    pantoprazole (PROTONIX) 40 MG tablet               Follow up in about 6 months (around 12/30/2024) for DM F/up.

## 2024-06-27 NOTE — ASSESSMENT & PLAN NOTE
Component Ref Range & Units 07:17 3 mo ago 1 yr ago 2 yr ago 3 yr ago 4 yr ago   Hemoglobin A1c <=7.0 % 5.8 7.7 High  5.1 5.0 R 5.0 R 5.0 R   Estimated Average Glucose mg/dL 119.8 174.3 99.7 96.8 R 96.8 97

## 2024-06-28 ENCOUNTER — OFFICE VISIT (OUTPATIENT)
Dept: PRIMARY CARE CLINIC | Facility: CLINIC | Age: 39
End: 2024-06-28
Payer: COMMERCIAL

## 2024-06-28 VITALS
RESPIRATION RATE: 18 BRPM | HEART RATE: 87 BPM | SYSTOLIC BLOOD PRESSURE: 125 MMHG | HEIGHT: 69 IN | BODY MASS INDEX: 32.29 KG/M2 | OXYGEN SATURATION: 99 % | WEIGHT: 218 LBS | DIASTOLIC BLOOD PRESSURE: 85 MMHG

## 2024-06-28 DIAGNOSIS — E78.5 DYSLIPIDEMIA: Chronic | ICD-10-CM

## 2024-06-28 DIAGNOSIS — K21.9 GERD WITHOUT ESOPHAGITIS: ICD-10-CM

## 2024-06-28 DIAGNOSIS — E11.65 TYPE 2 DIABETES MELLITUS WITH HYPERGLYCEMIA, WITHOUT LONG-TERM CURRENT USE OF INSULIN: Primary | Chronic | ICD-10-CM

## 2024-06-28 RX ORDER — PANTOPRAZOLE SODIUM 40 MG/1
40 TABLET, DELAYED RELEASE ORAL DAILY
Qty: 90 TABLET | Refills: 3 | Status: SHIPPED | OUTPATIENT
Start: 2024-06-28 | End: 2025-06-28

## 2024-08-13 ENCOUNTER — TELEPHONE (OUTPATIENT)
Dept: PHARMACY | Facility: CLINIC | Age: 39
End: 2024-08-13
Payer: COMMERCIAL

## 2024-09-13 ENCOUNTER — PATIENT OUTREACH (OUTPATIENT)
Facility: CLINIC | Age: 39
End: 2024-09-13
Payer: COMMERCIAL

## 2024-09-13 NOTE — PROGRESS NOTES
Health Maintenance Topic(s) Outreach Outcomes & Actions Taken:    Eye Exam - Outreach Outcomes & Actions Taken  : Attempted to contact patient. No answer. Left message.        Additional Notes:

## 2024-10-02 ENCOUNTER — PATIENT OUTREACH (OUTPATIENT)
Facility: CLINIC | Age: 39
End: 2024-10-02
Payer: COMMERCIAL

## 2024-10-23 ENCOUNTER — PATIENT OUTREACH (OUTPATIENT)
Facility: CLINIC | Age: 39
End: 2024-10-23
Payer: COMMERCIAL

## 2024-10-23 NOTE — PROGRESS NOTES
Health Maintenance Topic(s) Outreach Outcomes & Actions Taken:    Eye Exam - Outreach Outcomes & Actions Taken  : Due. Spoke to wife who states due to her surgeru and financial issues patiyangn has not been able to get one done yet. Patient will probably schedule an exam for the beginning of the year.          Additional Notes:

## 2024-11-11 ENCOUNTER — TELEPHONE (OUTPATIENT)
Dept: PHARMACY | Facility: CLINIC | Age: 39
End: 2024-11-11
Payer: COMMERCIAL

## 2024-11-11 NOTE — TELEPHONE ENCOUNTER
[facility]/Population Health Chart Review & Patient Outreach Details For Medication Adherence Project    Reason for Outreach Encounter: 3rd Party payor non-compliance report ([employer], [employer], [employer], etc)  2.  Patient Outreach Method: Reviewed patient chart   3.   Medication in question: metformin     metformin  last filled  11/11/24 for 30 day supply      4.  Reviewed and or Updates Made To: Patient Chart  5. Outreach Outcomes and/or actions taken: Patient filled medication and is on track to be adherent  Additional Notes:

## 2024-11-18 ENCOUNTER — TELEPHONE (OUTPATIENT)
Dept: PRIMARY CARE CLINIC | Facility: CLINIC | Age: 39
End: 2024-11-18
Payer: COMMERCIAL

## 2024-11-18 DIAGNOSIS — L30.9 HAND DERMATITIS: Primary | ICD-10-CM

## 2024-11-18 RX ORDER — PREDNISONE 20 MG/1
20 TABLET ORAL 2 TIMES DAILY
COMMUNITY
End: 2024-11-18 | Stop reason: SDUPTHER

## 2024-11-18 RX ORDER — PREDNISONE 20 MG/1
20 TABLET ORAL 2 TIMES DAILY
Qty: 20 TABLET | Refills: 2 | Status: SHIPPED | OUTPATIENT
Start: 2024-11-18

## 2024-11-18 NOTE — TELEPHONE ENCOUNTER
----- Message from Megan sent at 11/18/2024 10:10 AM CST -----  Who Called: Wilman Cano    Refill or New Rx:Refill  RX Name and Strength:predniSONE (DELTASONE) 20 MG tablet   How is the patient currently taking it? (ex. 1XDay):2xday  Is this a 30 day or 90 day RX:20 tablet    Local or Mail Order:local  List of preferred pharmacies on file (remove unneeded): [unfilled]  If different Pharmacy is requested, enter Pharmacy information here including location and phone number: [address]   Phone: [phone]  Fax: [phone]         Ordering Provider:clifford      Preferred Method of Contact: Phone Call  Patient's Preferred Phone Number on File: [phone]  jax -wife  Best Call Back Number, if different:  Additional Information: refill request, please advise, thanks

## 2024-12-16 ENCOUNTER — PATIENT OUTREACH (OUTPATIENT)
Facility: CLINIC | Age: 39
End: 2024-12-16
Payer: COMMERCIAL

## 2024-12-16 DIAGNOSIS — E11.65 TYPE 2 DIABETES MELLITUS WITH HYPERGLYCEMIA, WITHOUT LONG-TERM CURRENT USE OF INSULIN: Primary | Chronic | ICD-10-CM

## 2024-12-16 NOTE — PROGRESS NOTES
Population Health Outreach.  Spoke with patient's wife, states she takes care of all his appointments, would like an DM Eye exam scheduled for him in office, (done) she has not yet scheduled him with Optometry.  She plans to do so in the New Year.   PCP appt. 12/20/2024

## 2024-12-18 ENCOUNTER — CLINICAL SUPPORT (OUTPATIENT)
Dept: PRIMARY CARE CLINIC | Facility: CLINIC | Age: 39
End: 2024-12-18
Payer: COMMERCIAL

## 2024-12-18 DIAGNOSIS — E11.65 TYPE 2 DIABETES MELLITUS WITH HYPERGLYCEMIA, WITHOUT LONG-TERM CURRENT USE OF INSULIN: Chronic | ICD-10-CM

## 2024-12-18 DIAGNOSIS — E78.5 DYSLIPIDEMIA: Chronic | ICD-10-CM

## 2024-12-18 LAB
CHOLEST SERPL-MCNC: 131 MG/DL
CHOLEST/HDLC SERPL: 4 {RATIO} (ref 0–5)
EST. AVERAGE GLUCOSE BLD GHB EST-MCNC: 148.5 MG/DL
HBA1C MFR BLD: 6.8 %
HDLC SERPL-MCNC: 36 MG/DL (ref 35–60)
LDLC SERPL CALC-MCNC: 60 MG/DL (ref 50–140)
TRIGL SERPL-MCNC: 173 MG/DL (ref 34–140)
VLDLC SERPL CALC-MCNC: 35 MG/DL

## 2024-12-18 PROCEDURE — 80061 LIPID PANEL: CPT | Performed by: GENERAL PRACTICE

## 2024-12-18 PROCEDURE — 36415 COLL VENOUS BLD VENIPUNCTURE: CPT

## 2024-12-18 PROCEDURE — 83036 HEMOGLOBIN GLYCOSYLATED A1C: CPT | Performed by: GENERAL PRACTICE

## 2024-12-18 PROCEDURE — 36415 COLL VENOUS BLD VENIPUNCTURE: CPT | Mod: ,,, | Performed by: GENERAL PRACTICE

## 2024-12-19 NOTE — PROGRESS NOTES
"Subjective:       Patient ID: Wilman Cano is a 39 y.o. male.    Chief Complaint: Diabetes and Follow-up    Patient presents to the clinic today for chronic condition follow-up.  Doing well, no specific complaints, tolerating all medications, reporting no significant side effects or problems.    Last wellness exam was 03/21/2024.    Chronic conditions being treated include but are not limited to primary HTN, diabetes mellitus, dyslipidemia.      Review of Systems   Constitutional: Negative.  Negative for fatigue and fever.   HENT: Negative.  Negative for sore throat and trouble swallowing.    Eyes: Negative.  Negative for redness and visual disturbance.   Respiratory: Negative.  Negative for chest tightness and shortness of breath.    Cardiovascular: Negative.  Negative for chest pain.   Gastrointestinal: Negative.  Negative for abdominal pain, blood in stool and nausea.   Endocrine: Negative.  Negative for cold intolerance, heat intolerance and polyuria.   Genitourinary: Negative.    Musculoskeletal: Negative.  Negative for arthralgias, gait problem and joint swelling.   Integumentary:  Negative for rash. Negative.   Neurological: Negative.  Negative for dizziness, weakness and headaches.   Psychiatric/Behavioral: Negative.  Negative for agitation and dysphoric mood.            Patient Care Team:  Alfonso Garnder MD as PCP - General (Family Medicine)  Jake Jovel LPN as Care Coordinator  Objective:   Visit Vitals  /85   Pulse 80   Resp 18   Ht 5' 9" (1.753 m)   Wt 102.1 kg (225 lb)   SpO2 100%   BMI 33.23 kg/m²        Physical Exam  Constitutional:       Appearance: Normal appearance. He is obese.   HENT:      Head: Normocephalic.      Mouth/Throat:      Mouth: Mucous membranes are moist.      Pharynx: Oropharynx is clear.   Eyes:      Conjunctiva/sclera: Conjunctivae normal.      Pupils: Pupils are equal, round, and reactive to light.   Cardiovascular:      Rate and Rhythm: Normal rate and regular " rhythm.      Heart sounds: Normal heart sounds.   Pulmonary:      Effort: Pulmonary effort is normal.      Breath sounds: Normal breath sounds.   Abdominal:      General: Abdomen is flat.      Palpations: Abdomen is soft.   Musculoskeletal:         General: Normal range of motion.      Cervical back: Neck supple.   Skin:     General: Skin is warm and dry.   Neurological:      General: No focal deficit present.      Mental Status: He is alert and oriented to person, place, and time.   Psychiatric:         Mood and Affect: Mood normal.         Behavior: Behavior normal.         Thought Content: Thought content normal.         Judgment: Judgment normal.           Lab Results   Component Value Date     03/18/2024    K 4.1 03/18/2024     03/18/2024    CO2 23 03/18/2024    BUN 11.6 03/18/2024    CREATININE 1.07 03/18/2024    CALCIUM 9.1 03/18/2024    ALBUMIN 4.1 03/18/2024    BILITOT 0.6 03/18/2024    ALKPHOS 78 03/18/2024    AST 41 (H) 03/18/2024    ALT 79 (H) 03/18/2024    EGFRNORACEVR >60 03/18/2024     Lab Results   Component Value Date    WBC 5.76 03/18/2024    RBC 4.80 03/18/2024    HGB 14.1 03/18/2024    HCT 41.4 (L) 03/18/2024    MCV 86.3 03/18/2024    RDW 13.3 03/18/2024     03/18/2024      Lab Results   Component Value Date    CHOL 131 12/18/2024    TRIG 173 (H) 12/18/2024    HDL 36 12/18/2024    LDL 60.00 12/18/2024    TOTALCHOLEST 4 12/18/2024     Lab Results   Component Value Date    TSH 1.409 03/18/2024     Lab Results   Component Value Date    HGBA1C 6.8 12/18/2024        Assessment:       ICD-10-CM ICD-9-CM   1. Primary hypertension  I10 401.9   2. Dyslipidemia  E78.5 272.4   3. Type 2 diabetes mellitus without complication, without long-term current use of insulin  E11.9 250.00   4. Hand dermatitis  L30.9 692.9   5. Family history of early CAD  Z82.49 V17.3   6. Class 1 obesity due to excess calories with serious comorbidity and body mass index (BMI) of 33.0 to 33.9 in adult  E66.811  278.00    E66.09 V85.33    Z68.33        Current Outpatient Medications   Medication Instructions    betamethasone dipropionate 0.05 % cream 2 times daily    metFORMIN (GLUCOPHAGE) 500 mg, Oral, 2 times daily with meals    MOUNJARO 2.5 mg, Subcutaneous, Every 7 days    pantoprazole (PROTONIX) 40 mg, Oral, Daily    predniSONE (DELTASONE) 10 mg, Oral, 2 times daily    rosuvastatin (CRESTOR) 10 mg, Oral, Daily    telmisartan (MICARDIS) 80 mg, Oral, Daily     Plan:     Problem List Items Addressed This Visit          Derm    Hand dermatitis (Chronic)    Overview     Significant recurrent hand dermatitis.    Oral prednisone, 10 mg, once or twice a day for 5-7 days.  Protect hands as much as possible, take oral steroids only if necessary, will certainly cause an increase in blood sugar, he does understand.         Relevant Medications    predniSONE (DELTASONE) 10 MG tablet       Cardiac/Vascular    Dyslipidemia (Chronic)    Overview     Prescribed Crestor 10 mg daily.    Started on 03/21/2024.  Recheck lipid panel December 2024.    Most recent cholesterol/lipid blood testing shows adequate control, continue current treatment plan, including prescription medications if prescribed, and/or diet high in fiber, low in saturated fats as discussed during clinic visit.    Medication will be continued at current dosage.         Relevant Orders    Lipid Panel    Family history of early CAD (Chronic)    Overview     Cardiovascular risk factors that have been addressed are as follows:  HTN, diabetes mellitus, dyslipidemia, obesity.         Primary hypertension - Primary (Chronic)    Overview     Telmisartan 80 mg daily.    Dose increased on 03/21/2024.    Blood pressures appear to be adequately controlled with current treatment plan, including prescription blood pressure medication.  Medication(s) appear to be effective at current dosages, and are not causing any side effects or problems.  Continue medical management and continue  "home blood pressure checks.  Average blood pressures at home should be no greater than 130/80.  Patient instructed to contact the clinic if blood pressures become elevated at any point prior to next clinic visit.    Medication will be continued at the current dosage.             Relevant Orders    Comprehensive Metabolic Panel    CBC Auto Differential    TSH       Endocrine    Class 1 obesity due to excess calories with serious comorbidity and body mass index (BMI) of 33.0 to 33.9 in adult (Chronic)    Overview     Weight loss, healthy dietary choices, increased activity/exercise are recommended.  To lose weight, it is generally recommended to restrict calories to approximately 1500 calories per day to lose 1 lb per week, and approximately 1200 calories per day to lose up to 2 lb per week.  Generally, this is a healthy amount of weight to lose, and an appropriate amount of time to lose this amount of weight.  Adding exercise will assist in losing weight, particularly aerobic exercise such as walking.  However, resistance training, or lifting weights" over time may assistant weight loss by increasing basal metabolic rate, or the rate at which your body burns calories during periods of inactivity.    Keep track of BMI (body mass index), below 25 is considered normal, over 25 but below 30 is considered overweight, anything over 30 is considered moderately obese, over 35 severely obese, and over 40 is characterized as morbidly obese.         Type 2 diabetes mellitus without complication, without long-term current use of insulin (Chronic)    Overview     Prescribed metformin 500 mg daily, Mounjaro 2.5 mg weekly.    Metformin more than once a day caused GI side effects.  Mounjaro started on 12/20/2024.  Mounjaro too expensive, we may consider going to metformin XR.    Most recent hemoglobin A1c shows good control on diabetic treatment plan, including diabetic prescription medication.  Continue current treatment plan, " medical regimen, and lifestyle modification as discussed at this visit and during previous visits.    Medication will be continued at current dosage.         Current Assessment & Plan                Component Ref Range & Units 1 d ago 5 mo ago 9 mo ago 1 yr ago 2 yr ago 3 yr ago 4 yr ago   Hemoglobin A1c <=7.0 % 6.8 5.8 7.7 High  5.1 5.0 R 5.0 R 5.0 R   Estimated Average Glucose mg/dL 148.5 119.8 174.3 99.7 96.8 R 96.8 97               Relevant Medications    tirzepatide (MOUNJARO) 2.5 mg/0.5 mL PnIj    Other Relevant Orders    Hemoglobin A1C    Microalbumin/Creatinine Ratio, Urine               Follow up in about 3 months (around 3/26/2025) for Wellness.    This note was created with the assistance of [employer] voice recognition software or phone dictation. There may be transcription errors as a result of using this technology. Effort has been made to assure accuracy of transcription but any obvious errors or omissions should be clarified with the author of the document.

## 2024-12-19 NOTE — ASSESSMENT & PLAN NOTE
Component Ref Range & Units 1 d ago 5 mo ago 9 mo ago 1 yr ago 2 yr ago 3 yr ago 4 yr ago   Hemoglobin A1c <=7.0 % 6.8 5.8 7.7 High  5.1 5.0 R 5.0 R 5.0 R   Estimated Average Glucose mg/dL 148.5 119.8 174.3 99.7 96.8 R 96.8 97

## 2024-12-20 ENCOUNTER — CLINICAL SUPPORT (OUTPATIENT)
Dept: PRIMARY CARE CLINIC | Facility: CLINIC | Age: 39
End: 2024-12-20
Attending: GENERAL PRACTICE
Payer: COMMERCIAL

## 2024-12-20 ENCOUNTER — OFFICE VISIT (OUTPATIENT)
Dept: PRIMARY CARE CLINIC | Facility: CLINIC | Age: 39
End: 2024-12-20
Payer: COMMERCIAL

## 2024-12-20 VITALS
HEART RATE: 80 BPM | SYSTOLIC BLOOD PRESSURE: 133 MMHG | BODY MASS INDEX: 33.33 KG/M2 | HEIGHT: 69 IN | OXYGEN SATURATION: 100 % | WEIGHT: 225 LBS | RESPIRATION RATE: 18 BRPM | DIASTOLIC BLOOD PRESSURE: 85 MMHG

## 2024-12-20 DIAGNOSIS — E11.65 TYPE 2 DIABETES MELLITUS WITH HYPERGLYCEMIA, WITHOUT LONG-TERM CURRENT USE OF INSULIN: Chronic | ICD-10-CM

## 2024-12-20 DIAGNOSIS — E66.09 CLASS 1 OBESITY DUE TO EXCESS CALORIES WITH SERIOUS COMORBIDITY AND BODY MASS INDEX (BMI) OF 33.0 TO 33.9 IN ADULT: Chronic | ICD-10-CM

## 2024-12-20 DIAGNOSIS — I10 PRIMARY HYPERTENSION: Primary | Chronic | ICD-10-CM

## 2024-12-20 DIAGNOSIS — L30.9 HAND DERMATITIS: Chronic | ICD-10-CM

## 2024-12-20 DIAGNOSIS — E78.5 DYSLIPIDEMIA: Chronic | ICD-10-CM

## 2024-12-20 DIAGNOSIS — Z82.49 FAMILY HISTORY OF EARLY CAD: Chronic | ICD-10-CM

## 2024-12-20 DIAGNOSIS — E11.9 TYPE 2 DIABETES MELLITUS WITHOUT COMPLICATION, WITHOUT LONG-TERM CURRENT USE OF INSULIN: Chronic | ICD-10-CM

## 2024-12-20 DIAGNOSIS — E66.811 CLASS 1 OBESITY DUE TO EXCESS CALORIES WITH SERIOUS COMORBIDITY AND BODY MASS INDEX (BMI) OF 33.0 TO 33.9 IN ADULT: Chronic | ICD-10-CM

## 2024-12-20 RX ORDER — PREDNISONE 10 MG/1
10 TABLET ORAL 2 TIMES DAILY
Qty: 14 TABLET | Refills: 3 | Status: SHIPPED | OUTPATIENT
Start: 2024-12-20 | End: 2025-06-18

## 2024-12-20 RX ORDER — TIRZEPATIDE 2.5 MG/.5ML
2.5 INJECTION, SOLUTION SUBCUTANEOUS
Qty: 4 PEN | Refills: 11 | Status: SHIPPED | OUTPATIENT
Start: 2024-12-20 | End: 2025-12-20

## 2024-12-20 NOTE — PROGRESS NOTES
Wilman Cano is a 39 y.o. male here for a diabetic eye screening with non-dilated fundus photos per Dr Gardner .    Patient cooperative?: Yes  Small pupils?: No  Last eye exam: unknown    For exam results, see Encounter Report.

## 2024-12-26 ENCOUNTER — TELEPHONE (OUTPATIENT)
Dept: PHARMACY | Facility: CLINIC | Age: 39
End: 2024-12-26
Payer: COMMERCIAL

## 2024-12-26 NOTE — TELEPHONE ENCOUNTER
[facility]/Population Health Chart Review & Patient Outreach Details For Medication Adherence Project    Reason for Outreach Encounter: 3rd Party payor non-compliance report ([employer], [employer], C, etc)  2.  Patient Outreach Method: Reviewed patient chart   3.   Medication in question:    Diabetes Medications               metFORMIN (GLUCOPHAGE) 500 MG tablet Take 1 tablet (500 mg total) by mouth 2 (two) times daily with meals.    tirzepatide (MOUNJARO) 2.5 mg/0.5 mL PnIj Inject 2.5 mg into the skin every 7 days.                 Metformin  last filled  12/11/24 for 30 day supply      4.  Reviewed and or Updates Made To: Patient Chart  5. Outreach Outcomes and/or actions taken: Patient filled medication and is on track to be adherent  Additional Notes:

## 2025-01-03 ENCOUNTER — TELEPHONE (OUTPATIENT)
Dept: PRIMARY CARE CLINIC | Facility: CLINIC | Age: 40
End: 2025-01-03
Payer: COMMERCIAL

## 2025-01-03 NOTE — TELEPHONE ENCOUNTER
----- Message from Shanta sent at 1/3/2025  8:27 AM CST -----  Regarding: call back  .Who Called: Wilman Cano    Caller is requesting assistance/information from provider's office.    Symptoms (please be specific):    How long has patient had these symptoms:    List of preferred pharmacies on file (remove unneeded): [unfilled]  If different, enter pharmacy into here including location and phone number:       Preferred Method of Contact: Phone Call  Patient's Preferred Phone Number on File: [phone]   Best Call Back Number, if different:  Additional Information: pt requesting a call back pt is currently taking  tirzepatide (MOUNJARO) 2.5 mg/0.5 mL PnIj he wanted to know if he was suppose to continue taking metFORMIN (GLUCOPHAGE) 500 MG tablet

## 2025-02-20 ENCOUNTER — TELEPHONE (OUTPATIENT)
Dept: PHARMACY | Facility: CLINIC | Age: 40
End: 2025-02-20
Payer: COMMERCIAL

## 2025-02-20 NOTE — TELEPHONE ENCOUNTER
[facility]/Population Health Chart Review & Patient Outreach Details For Medication Adherence Project    Reason for Outreach Encounter: 3rd Party payor non-compliance report ([employer], [employer], C, etc)  2.  Patient Outreach Method: Reviewed patient chart  and [employer] message  3.   Medication in question:    Diabetes Medications              metFORMIN (GLUCOPHAGE) 500 MG tablet Take 1 tablet (500 mg total) by mouth 2 (two) times daily with meals.    tirzepatide (MOUNJARO) 2.5 mg/0.5 mL PnIj Inject 2.5 mg into the skin every 7 days.                 metformin  last filled  1/15 for 30 day supply      4.  Reviewed and or Updates Made To: Patient Chart  5. Outreach Outcomes and/or actions taken: Sent inquiry to patient: Waiting for response  Additional Notes:

## 2025-03-07 ENCOUNTER — TELEPHONE (OUTPATIENT)
Dept: PHARMACY | Facility: CLINIC | Age: 40
End: 2025-03-07
Payer: COMMERCIAL

## 2025-03-07 NOTE — TELEPHONE ENCOUNTER
[facility]/Population Health Chart Review & Patient Outreach Details For Medication Adherence Project    Reason for Outreach Encounter: 3rd Party payor non-compliance report ([employer], [employer], C, etc)  2.  Patient Outreach Method: Reviewed patient chart  and [employer] message  3.   Medication in question:    Diabetes Medications              metFORMIN (GLUCOPHAGE) 500 MG tablet Take 1 tablet (500 mg total) by mouth 2 (two) times daily with meals.    tirzepatide (MOUNJARO) 2.5 mg/0.5 mL PnIj Inject 2.5 mg into the skin every 7 days.                 metformin  last filled  1/15 for 30 day supply      4.  Reviewed and or Updates Made To: Patient Chart  5. Outreach Outcomes and/or actions taken: Sent inquiry to patient: Waiting for response  Additional Notes:

## 2025-03-19 ENCOUNTER — LAB VISIT (OUTPATIENT)
Dept: LAB | Facility: HOSPITAL | Age: 40
End: 2025-03-19
Attending: GENERAL PRACTICE
Payer: COMMERCIAL

## 2025-03-19 DIAGNOSIS — I10 PRIMARY HYPERTENSION: ICD-10-CM

## 2025-03-19 DIAGNOSIS — E11.9 TYPE 2 DIABETES MELLITUS WITHOUT COMPLICATION, WITHOUT LONG-TERM CURRENT USE OF INSULIN: Chronic | ICD-10-CM

## 2025-03-19 DIAGNOSIS — E78.5 DYSLIPIDEMIA: Chronic | ICD-10-CM

## 2025-03-19 LAB
ALBUMIN SERPL-MCNC: 4.2 G/DL (ref 3.5–5)
ALBUMIN/GLOB SERPL: 1.6 RATIO (ref 1.1–2)
ALP SERPL-CCNC: 72 UNIT/L (ref 40–150)
ALT SERPL-CCNC: 58 UNIT/L (ref 0–55)
ANION GAP SERPL CALC-SCNC: 10 MEQ/L
AST SERPL-CCNC: 29 UNIT/L (ref 5–34)
BASOPHILS # BLD AUTO: 0.09 X10(3)/MCL
BASOPHILS NFR BLD AUTO: 1.1 %
BILIRUB SERPL-MCNC: 0.6 MG/DL
BUN SERPL-MCNC: 11.3 MG/DL (ref 8.9–20.6)
CALCIUM SERPL-MCNC: 9.6 MG/DL (ref 8.4–10.2)
CHLORIDE SERPL-SCNC: 106 MMOL/L (ref 98–107)
CHOLEST SERPL-MCNC: 105 MG/DL
CHOLEST/HDLC SERPL: 3 {RATIO} (ref 0–5)
CO2 SERPL-SCNC: 25 MMOL/L (ref 22–29)
CREAT SERPL-MCNC: 0.96 MG/DL (ref 0.72–1.25)
CREAT UR-MCNC: 210.3 MG/DL (ref 63–166)
CREAT/UREA NIT SERPL: 12
EOSINOPHIL # BLD AUTO: 0.39 X10(3)/MCL (ref 0–0.9)
EOSINOPHIL NFR BLD AUTO: 4.8 %
ERYTHROCYTE [DISTWIDTH] IN BLOOD BY AUTOMATED COUNT: 13.5 % (ref 11.5–17)
EST. AVERAGE GLUCOSE BLD GHB EST-MCNC: 111.2 MG/DL
GFR SERPLBLD CREATININE-BSD FMLA CKD-EPI: >60 ML/MIN/1.73/M2
GLOBULIN SER-MCNC: 2.6 GM/DL (ref 2.4–3.5)
GLUCOSE SERPL-MCNC: 128 MG/DL (ref 74–100)
HBA1C MFR BLD: 5.5 %
HCT VFR BLD AUTO: 42.3 % (ref 42–52)
HDLC SERPL-MCNC: 33 MG/DL (ref 35–60)
HGB BLD-MCNC: 13.9 G/DL (ref 14–18)
IMM GRANULOCYTES # BLD AUTO: 0.18 X10(3)/MCL (ref 0–0.04)
IMM GRANULOCYTES NFR BLD AUTO: 2.2 %
LDLC SERPL CALC-MCNC: 47 MG/DL (ref 50–140)
LYMPHOCYTES # BLD AUTO: 2.08 X10(3)/MCL (ref 0.6–4.6)
LYMPHOCYTES NFR BLD AUTO: 25.8 %
MCH RBC QN AUTO: 29.1 PG (ref 27–31)
MCHC RBC AUTO-ENTMCNC: 32.9 G/DL (ref 33–36)
MCV RBC AUTO: 88.7 FL (ref 80–94)
MICROALBUMIN UR-MCNC: 40.9 UG/ML
MICROALBUMIN/CREAT RATIO PNL UR: 19.4 MG/GM CR (ref 0–30)
MONOCYTES # BLD AUTO: 0.59 X10(3)/MCL (ref 0.1–1.3)
MONOCYTES NFR BLD AUTO: 7.3 %
NEUTROPHILS # BLD AUTO: 4.73 X10(3)/MCL (ref 2.1–9.2)
NEUTROPHILS NFR BLD AUTO: 58.8 %
NRBC BLD AUTO-RTO: 0.2 %
PLATELET # BLD AUTO: 251 X10(3)/MCL (ref 130–400)
PMV BLD AUTO: 10.2 FL (ref 7.4–10.4)
POTASSIUM SERPL-SCNC: 4.2 MMOL/L (ref 3.5–5.1)
PROT SERPL-MCNC: 6.8 GM/DL (ref 6.4–8.3)
RBC # BLD AUTO: 4.77 X10(6)/MCL (ref 4.7–6.1)
SODIUM SERPL-SCNC: 141 MMOL/L (ref 136–145)
TRIGL SERPL-MCNC: 125 MG/DL (ref 34–140)
TSH SERPL-ACNC: 1.49 UIU/ML (ref 0.35–4.94)
VLDLC SERPL CALC-MCNC: 25 MG/DL
WBC # BLD AUTO: 8.06 X10(3)/MCL (ref 4.5–11.5)

## 2025-03-19 PROCEDURE — 85025 COMPLETE CBC W/AUTO DIFF WBC: CPT

## 2025-03-19 PROCEDURE — 80053 COMPREHEN METABOLIC PANEL: CPT

## 2025-03-19 PROCEDURE — 82043 UR ALBUMIN QUANTITATIVE: CPT

## 2025-03-19 PROCEDURE — 83036 HEMOGLOBIN GLYCOSYLATED A1C: CPT

## 2025-03-19 PROCEDURE — 36415 COLL VENOUS BLD VENIPUNCTURE: CPT

## 2025-03-19 PROCEDURE — 84443 ASSAY THYROID STIM HORMONE: CPT

## 2025-03-19 PROCEDURE — 80061 LIPID PANEL: CPT

## 2025-03-20 ENCOUNTER — RESULTS FOLLOW-UP (OUTPATIENT)
Dept: PRIMARY CARE CLINIC | Facility: CLINIC | Age: 40
End: 2025-03-20

## 2025-03-23 NOTE — ASSESSMENT & PLAN NOTE
Component  Ref Range & Units (hover) 4 d ago 3 mo ago 8 mo ago 1 yr ago 2 yr ago 3 yr ago 4 yr ago   Hemoglobin A1c 5.5 6.8 5.8 7.7 High  5.1 5.0 R 5.0 R   Estimated Average Glucose 111.2 148.5 119.8 174.3 99.7 96.8 R 96.8

## 2025-03-23 NOTE — PROGRESS NOTES
"Subjective:       Patient ID: Wilman Cano is a 39 y.o. male.    Chief Complaint: Annual Exam    Patient presents to the clinic today for wellness examination.  Current and past medical history reviewed.  Pertinent family and social history reviewed.    Vaccinations due:  Patient will receive any vaccinations that are currently due and desired.    No complaints today.        Review of Systems   Constitutional: Negative.  Negative for fatigue and fever.   HENT: Negative.  Negative for sore throat and trouble swallowing.    Eyes: Negative.  Negative for redness and visual disturbance.   Respiratory: Negative.  Negative for chest tightness and shortness of breath.    Cardiovascular: Negative.  Negative for chest pain.   Gastrointestinal: Negative.  Negative for abdominal pain, blood in stool and nausea.   Endocrine: Negative.  Negative for cold intolerance, heat intolerance and polyuria.   Genitourinary: Negative.    Musculoskeletal: Negative.  Negative for arthralgias, gait problem and joint swelling.   Integumentary:  Negative for rash. Negative.   Neurological: Negative.  Negative for dizziness, weakness and headaches.   Psychiatric/Behavioral: Negative.  Negative for agitation and dysphoric mood.            Patient Care Team:  Alfonso Gardner MD as PCP - General (Family Medicine)  Jake Jovel LPN as Care Coordinator  Objective:   Visit Vitals  /76   Pulse 79   Resp 18   Ht 5' 9" (1.753 m)   Wt 102.1 kg (225 lb)   SpO2 99%   BMI 33.23 kg/m²        Physical Exam  Constitutional:       Appearance: Normal appearance. He is obese.   HENT:      Head: Normocephalic.      Mouth/Throat:      Mouth: Mucous membranes are moist.      Pharynx: Oropharynx is clear.   Eyes:      Conjunctiva/sclera: Conjunctivae normal.      Pupils: Pupils are equal, round, and reactive to light.   Cardiovascular:      Rate and Rhythm: Normal rate and regular rhythm.      Heart sounds: Normal heart sounds.   Pulmonary:      Effort: " Pulmonary effort is normal.      Breath sounds: Normal breath sounds.   Abdominal:      General: Abdomen is flat.      Palpations: Abdomen is soft.   Musculoskeletal:         General: Normal range of motion.      Cervical back: Neck supple.   Skin:     General: Skin is warm and dry.   Neurological:      General: No focal deficit present.      Mental Status: He is alert and oriented to person, place, and time.   Psychiatric:         Mood and Affect: Mood normal.         Behavior: Behavior normal.         Thought Content: Thought content normal.         Judgment: Judgment normal.           Lab Results   Component Value Date     03/19/2025    K 4.2 03/19/2025     03/19/2025    CO2 25 03/19/2025    BUN 11.3 03/19/2025    CREATININE 0.96 03/19/2025    CALCIUM 9.6 03/19/2025    ALBUMIN 4.2 03/19/2025    BILITOT 0.6 03/19/2025    ALKPHOS 72 03/19/2025    AST 29 03/19/2025    ALT 58 (H) 03/19/2025    EGFRNORACEVR >60 03/19/2025     Lab Results   Component Value Date    WBC 8.06 03/19/2025    RBC 4.77 03/19/2025    HGB 13.9 (L) 03/19/2025    HCT 42.3 03/19/2025    MCV 88.7 03/19/2025    RDW 13.5 03/19/2025     03/19/2025      Lab Results   Component Value Date    CHOL 105 03/19/2025    TRIG 125 03/19/2025    HDL 33 (L) 03/19/2025    LDL 47.00 (L) 03/19/2025    TOTALCHOLEST 3 03/19/2025     Lab Results   Component Value Date    TSH 1.492 03/19/2025     Lab Results   Component Value Date    HGBA1C 5.5 03/19/2025       Assessment:       ICD-10-CM ICD-9-CM   1. Wellness examination  Z00.00 V70.0   2. Primary hypertension  I10 401.9   3. Type 2 diabetes mellitus without complication, without long-term current use of insulin  E11.9 250.00   4. Dyslipidemia  E78.5 272.4   5. Family history of early CAD  Z82.49 V17.3   6. GERD without esophagitis  K21.9 530.81   7. Class 1 obesity due to excess calories with serious comorbidity and body mass index (BMI) of 33.0 to 33.9 in adult  E66.811 278.00    E66.09 V85.33     Z68.33    8. Normocytic anemia  D64.9 285.9   9. Psoriasis  L40.9 696.1   10. Hand dermatitis  L30.9 692.9       Current Outpatient Medications   Medication Instructions    MOUNJARO 5 mg, Subcutaneous, Every 7 days    pantoprazole (PROTONIX) 40 mg, Oral, Daily    rosuvastatin (CRESTOR) 10 mg, Oral, Daily    telmisartan (MICARDIS) 80 mg, Oral, Daily     Plan:     Problem List Items Addressed This Visit          Derm    Hand dermatitis (Chronic)    Overview   Significant recurrent hand dermatitis.    Prednisone and topical steroids did help, but patient continues to have problems, along with psoriatic lesions on his elbows and knees, I will place a referral to see Dr. Edward Baig, dermatologist for additional evaluation.         Psoriasis    Overview   Multiple lesions consistent with psoriasis, I will place a referral for him to see Dr. Edward Baig for an evaluation.         Relevant Orders    Ambulatory referral/consult to Dermatology       Cardiac/Vascular    Dyslipidemia (Chronic)    Overview   Prescribed Crestor 10 mg daily.    Most recent cholesterol/lipid blood testing shows adequate control, continue current treatment plan, including prescription medications if prescribed, and/or diet high in fiber, low in saturated fats as discussed during clinic visit.    Medication will be continued at current dosage.         Relevant Medications    rosuvastatin (CRESTOR) 10 MG tablet    Family history of early CAD (Chronic)    Overview   Family members with known history of coronary artery disease.    Modifiable cardiovascular risk factors that have been addressed and are being actively monitored and/or treated are as follows:    HTN, diabetes mellitus, dyslipidemia, obesity.         Primary hypertension (Chronic)    Overview   Telmisartan 80 mg daily.    Blood pressures appear to be adequately controlled with current treatment plan, including prescription blood pressure medication.  Medication(s) appear to be effective at  "current dosages, and are not causing any side effects or problems.  Continue medical management and continue home blood pressure checks.  Average blood pressures at home should be no greater than 130/80.  Patient instructed to contact the clinic if blood pressures become elevated at any point prior to next clinic visit.    Medication will be continued at the current dosage.             Relevant Medications    telmisartan (MICARDIS) 80 MG Tab       Oncology    Normocytic anemia (Chronic)    Overview   Patient has a diagnosis of anemia, etiology uncertain.  Patient has had previous or current hemoglobin levels that have been below normal.  Anemia has been mild and asymptomatic.  We will continue to monitor regularly.  If anemia changes in any significant way, or if any significant symptoms develop which could be attributed to the anemia, more aggressive evaluation, treatment, and possibly referral will be considered.    CBC will be rechecked in September 2025.         Current Assessment & Plan   Very mild anemia, we will continue to monitor and consider anemia studies if his anemia worsens at any point as of 03/24/2025.         Relevant Orders    CBC Auto Differential       Endocrine    Class 1 obesity due to excess calories with serious comorbidity and body mass index (BMI) of 33.0 to 33.9 in adult (Chronic)    Overview   Weight loss, healthy dietary choices, increased activity/exercise are recommended.  To lose weight, it is generally recommended to restrict calories to approximately 1500 calories per day to lose 1 lb per week, and approximately 1200 calories per day to lose up to 2 lb per week.  Generally, this is a healthy amount of weight to lose, and an appropriate amount of time to lose this amount of weight.  Adding exercise will assist in losing weight, particularly aerobic exercise such as walking.  However, resistance training, or lifting weights" over time may assistant weight loss by increasing basal " metabolic rate, or the rate at which your body burns calories during periods of inactivity.    Keep track of BMI (body mass index), below 25 is considered normal, over 25 but below 30 is considered overweight, anything over 30 is considered moderately obese, over 35 severely obese, and over 40 is characterized as morbidly obese.         Type 2 diabetes mellitus without complication, without long-term current use of insulin (Chronic)    Overview   Prescribed Mounjaro 5 mg weekly.    Mounjaro dose increased on 03/24/2025, metformin discontinued.    Most recent hemoglobin A1c shows good control on diabetic treatment plan, including diabetic prescription medication.  Continue current treatment plan, medical regimen, and lifestyle modification as discussed at this visit and during previous visits.    Medication will be continued at current dosage.         Current Assessment & Plan             Component  Ref Range & Units (hover) 4 d ago 3 mo ago 8 mo ago 1 yr ago 2 yr ago 3 yr ago 4 yr ago   Hemoglobin A1c 5.5 6.8 5.8 7.7 High  5.1 5.0 R 5.0 R   Estimated Average Glucose 111.2 148.5 119.8 174.3 99.7 96.8 R 96.8               Relevant Medications    tirzepatide (MOUNJARO) 5 mg/0.5 mL PnIj    Other Relevant Orders    Hemoglobin A1C       GI    GERD without esophagitis (Chronic)    Overview   Prescribed pantoprazole 40 mg daily p.r.n. reflux.    Symptoms of GERD have been modified and controlled using a PPI medication.  Patient was advised to continue reflux precautions as discussed in the past, and at some point would be prudent to discuss possible decreased frequency of PPI use or cessation of PPI use altogether.   It is hopeful at some point that the patient can use the medication on an as-needed basis.          Other Visit Diagnoses         Wellness examination    -  Primary    Overall health status was reviewed.  Good health habits reinforced.  Annual wellness exams recommended.                    Follow up in about 6  months (around 9/24/2025) for Chronic condition/DM F/up.    This note was created with the assistance of [employer] voice recognition software or phone dictation. There may be transcription errors as a result of using this technology. Effort has been made to assure accuracy of transcription but any obvious errors or omissions should be clarified with the author of the document.

## 2025-03-24 ENCOUNTER — OFFICE VISIT (OUTPATIENT)
Dept: PRIMARY CARE CLINIC | Facility: CLINIC | Age: 40
End: 2025-03-24
Payer: COMMERCIAL

## 2025-03-24 VITALS
HEIGHT: 69 IN | HEART RATE: 79 BPM | OXYGEN SATURATION: 99 % | DIASTOLIC BLOOD PRESSURE: 76 MMHG | BODY MASS INDEX: 33.33 KG/M2 | WEIGHT: 225 LBS | SYSTOLIC BLOOD PRESSURE: 125 MMHG | RESPIRATION RATE: 18 BRPM

## 2025-03-24 DIAGNOSIS — Z82.49 FAMILY HISTORY OF EARLY CAD: Chronic | ICD-10-CM

## 2025-03-24 DIAGNOSIS — Z00.00 WELLNESS EXAMINATION: Primary | ICD-10-CM

## 2025-03-24 DIAGNOSIS — L30.9 HAND DERMATITIS: Chronic | ICD-10-CM

## 2025-03-24 DIAGNOSIS — I10 PRIMARY HYPERTENSION: Chronic | ICD-10-CM

## 2025-03-24 DIAGNOSIS — L40.9 PSORIASIS: ICD-10-CM

## 2025-03-24 DIAGNOSIS — E11.9 TYPE 2 DIABETES MELLITUS WITHOUT COMPLICATION, WITHOUT LONG-TERM CURRENT USE OF INSULIN: Chronic | ICD-10-CM

## 2025-03-24 DIAGNOSIS — E66.09 CLASS 1 OBESITY DUE TO EXCESS CALORIES WITH SERIOUS COMORBIDITY AND BODY MASS INDEX (BMI) OF 33.0 TO 33.9 IN ADULT: Chronic | ICD-10-CM

## 2025-03-24 DIAGNOSIS — D64.9 NORMOCYTIC ANEMIA: ICD-10-CM

## 2025-03-24 DIAGNOSIS — E78.5 DYSLIPIDEMIA: Chronic | ICD-10-CM

## 2025-03-24 DIAGNOSIS — K21.9 GERD WITHOUT ESOPHAGITIS: Chronic | ICD-10-CM

## 2025-03-24 DIAGNOSIS — E66.811 CLASS 1 OBESITY DUE TO EXCESS CALORIES WITH SERIOUS COMORBIDITY AND BODY MASS INDEX (BMI) OF 33.0 TO 33.9 IN ADULT: Chronic | ICD-10-CM

## 2025-03-24 RX ORDER — TIRZEPATIDE 5 MG/.5ML
5 INJECTION, SOLUTION SUBCUTANEOUS
Qty: 4 PEN | Refills: 11 | Status: SHIPPED | OUTPATIENT
Start: 2025-03-24 | End: 2025-03-24

## 2025-03-24 RX ORDER — ROSUVASTATIN CALCIUM 10 MG/1
10 TABLET, COATED ORAL DAILY
Qty: 90 TABLET | Refills: 3 | Status: SHIPPED | OUTPATIENT
Start: 2025-03-24 | End: 2026-03-24

## 2025-03-24 RX ORDER — TELMISARTAN 80 MG/1
80 TABLET ORAL DAILY
Qty: 90 TABLET | Refills: 3 | Status: SHIPPED | OUTPATIENT
Start: 2025-03-24 | End: 2026-03-24

## 2025-03-24 RX ORDER — TIRZEPATIDE 5 MG/.5ML
5 INJECTION, SOLUTION SUBCUTANEOUS
Qty: 4 PEN | Refills: 11 | Status: SHIPPED | OUTPATIENT
Start: 2025-03-24 | End: 2026-03-24

## 2025-03-24 NOTE — ASSESSMENT & PLAN NOTE
Very mild anemia, we will continue to monitor and consider anemia studies if his anemia worsens at any point as of 03/24/2025.

## 2025-04-07 ENCOUNTER — PATIENT MESSAGE (OUTPATIENT)
Dept: PRIMARY CARE CLINIC | Facility: CLINIC | Age: 40
End: 2025-04-07
Payer: COMMERCIAL

## 2025-06-25 ENCOUNTER — TELEPHONE (OUTPATIENT)
Dept: PHARMACY | Facility: CLINIC | Age: 40
End: 2025-06-25
Payer: COMMERCIAL

## 2025-07-06 ENCOUNTER — TELEPHONE (OUTPATIENT)
Dept: PHARMACY | Facility: CLINIC | Age: 40
End: 2025-07-06
Payer: COMMERCIAL

## 2025-07-06 NOTE — TELEPHONE ENCOUNTER
[facility]/Population Health Chart Review & Patient Outreach Details For Medication Adherence Project     1.Reason for Outreach Encounter: 3rd Party payor non-compliance report ([employer], [employer], [facility], etc)  2.  Patient Outreach Method: Reviewed Patient Chart  3.   Medication in question: Mounjaro 5mg/dose  LAST FILLED: n/a    Diabetes Medications              tirzepatide (MOUNJARO) 5 mg/0.5 mL PnIj Inject 5 mg into the skin every 7 days.              4.  Reviewed and or Updates Made To: Patient Chart  5. Outreach Outcomes and/or actions taken: Medication discontinued     Additional Notes:

## 2025-07-08 ENCOUNTER — OFFICE VISIT (OUTPATIENT)
Dept: URGENT CARE | Facility: CLINIC | Age: 40
End: 2025-07-08
Payer: COMMERCIAL

## 2025-07-08 VITALS
TEMPERATURE: 99 F | DIASTOLIC BLOOD PRESSURE: 75 MMHG | SYSTOLIC BLOOD PRESSURE: 109 MMHG | WEIGHT: 227 LBS | HEIGHT: 69 IN | OXYGEN SATURATION: 98 % | HEART RATE: 72 BPM | BODY MASS INDEX: 33.62 KG/M2 | RESPIRATION RATE: 20 BRPM

## 2025-07-08 DIAGNOSIS — R50.9 FEVER, UNSPECIFIED FEVER CAUSE: Primary | ICD-10-CM

## 2025-07-08 LAB
CTP QC/QA: YES
POC MOLECULAR INFLUENZA A AGN: NEGATIVE
POC MOLECULAR INFLUENZA B AGN: NEGATIVE

## 2025-07-08 PROCEDURE — 87502 INFLUENZA DNA AMP PROBE: CPT | Mod: QW,,, | Performed by: NURSE PRACTITIONER

## 2025-07-08 PROCEDURE — 99213 OFFICE O/P EST LOW 20 MIN: CPT | Mod: ,,, | Performed by: NURSE PRACTITIONER

## 2025-07-08 NOTE — LETTER
July 8, 2025      [facility]  [address] [address]  Phone: [phone]       Patient: Wilman Cano   YOB: 1985  Date of Visit: 07/08/2025    To Whom It May Concern:    Kesha Cano  was at [facility] on 07/08/2025. The patient may return to work/school on 7/10/2025 or 24 hours fever free with no restrictions. If you have any questions or concerns, or if I can be of further assistance, please do not hesitate to contact me.    Sincerely,    Walt Forde NP

## 2025-07-08 NOTE — PROGRESS NOTES
"Subjective:      Patient ID: Wilman Cano is a 39 y.o. male.    Vitals:  height is 5' 9" (1.753 m) and weight is 103 kg (227 lb). His oral temperature is 98.9 °F (37.2 °C). His blood pressure is 109/75 and his pulse is 72. His respiration is 20 and oxygen saturation is 98%.     Chief Complaint: Fever     Patient is a 39 y.o. male who presents to urgent care with complaints of fever  x 1 days. Alleviating factors include tylenol with mild amount of relief. Patient denies SOB.  Patient states beginning with fever last night with T-max of 101.9°.  Taking Tylenol last night which broke fever through most of the night but awakening this morning he was 102.1 F states taking and under those Tylenol prior to arrival he has normal thermic now 98.9.  He denies any body aches headache nausea or vomiting diarrhea denies any sore throat nasal congestion only symptoms has been fever and while suffering from fever having mild body aches.        Constitution: Positive for fever. Negative for fatigue.   HENT: Negative.  Negative for sinus pain and sore throat.    Cardiovascular: Negative.    Eyes: Negative.    Gastrointestinal:  Negative for nausea, vomiting and diarrhea.   Endocrine: negative.   Genitourinary:  Negative for dysuria, frequency, urgency and urine decreased.   Musculoskeletal:  Negative for muscle ache.   Neurological: Negative.  Negative for headaches.      Objective:     Physical Exam   Constitutional: He is oriented to person, place, and time. He appears well-developed. He is cooperative.  Non-toxic appearance. He does not appear ill. No distress.   HENT:   Head: Normocephalic and atraumatic.   Ears:   Right Ear: Hearing, tympanic membrane, external ear and ear canal normal.   Left Ear: Hearing, tympanic membrane, external ear and ear canal normal.   Nose: Nose normal. No mucosal edema, rhinorrhea or nasal deformity. No epistaxis. Right sinus exhibits no maxillary sinus tenderness and no frontal sinus tenderness. " Left sinus exhibits no maxillary sinus tenderness and no frontal sinus tenderness.   Mouth/Throat: Uvula is midline, oropharynx is clear and moist and mucous membranes are normal. No trismus in the jaw. Normal dentition. No uvula swelling. No oropharyngeal exudate, posterior oropharyngeal edema or posterior oropharyngeal erythema.   Eyes: Conjunctivae and lids are normal. No scleral icterus.   Neck: Trachea normal and phonation normal. Neck supple. No edema present. No erythema present. No neck rigidity present.   Cardiovascular: Normal rate, regular rhythm, normal heart sounds and normal pulses.   Pulmonary/Chest: Effort normal and breath sounds normal. No respiratory distress. He has no decreased breath sounds. He has no rhonchi.   Abdominal: Normal appearance.   Musculoskeletal: Normal range of motion.         General: No deformity. Normal range of motion.   Neurological: He is alert and oriented to person, place, and time. He exhibits normal muscle tone. Coordination normal.   Skin: Skin is warm, dry, intact, not diaphoretic and not pale.   Psychiatric: His speech is normal and behavior is normal. Judgment and thought content normal.   Nursing note and vitals reviewed.         Previous History      Review of patient's allergies indicates:   Allergen Reactions    Penicillins        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    Hypertension     Known health problems: none      Current Outpatient Medications   Medication Instructions    MOUNJARO 5 mg, Subcutaneous, Every 7 days    pantoprazole (PROTONIX) 40 mg, Oral, Daily    rosuvastatin (CRESTOR) 10 mg, Oral, Daily    telmisartan (MICARDIS) 80 mg, Oral, Daily     Past Surgical History:   Procedure Laterality Date    NO PAST SURGERIES           Social History[1]     Physical Exam      Vital Signs Reviewed   /75 (BP Location: Right arm, Patient Position: Sitting)   Pulse 72   Temp 98.9 °F (37.2 °C) (Oral)  "  Resp 20   Ht 5' 9" (1.753 m)   Wt 103 kg (227 lb)   SpO2 98%   BMI 33.52 kg/m²        Procedures    Procedures     Labs     Results for orders placed or performed in visit on 03/19/25   Comprehensive Metabolic Panel    Collection Time: 03/19/25  7:43 AM   Result Value Ref Range    Sodium 141 136 - 145 mmol/L    Potassium 4.2 3.5 - 5.1 mmol/L    Chloride 106 98 - 107 mmol/L    CO2 25 22 - 29 mmol/L    Glucose 128 (H) 74 - 100 mg/dL    Blood Urea Nitrogen 11.3 8.9 - 20.6 mg/dL    Creatinine 0.96 0.72 - 1.25 mg/dL    Calcium 9.6 8.4 - 10.2 mg/dL    Protein Total 6.8 6.4 - 8.3 gm/dL    Albumin 4.2 3.5 - 5.0 g/dL    Globulin 2.6 2.4 - 3.5 gm/dL    Albumin/Globulin Ratio 1.6 1.1 - 2.0 ratio    Bilirubin Total 0.6 <=1.5 mg/dL    ALP 72 40 - 150 unit/L    ALT 58 (H) 0 - 55 unit/L    AST 29 5 - 34 unit/L    eGFR >60 mL/min/1.73/m2    Anion Gap 10.0 mEq/L    BUN/Creatinine Ratio 12    Lipid Panel    Collection Time: 03/19/25  7:43 AM   Result Value Ref Range    Cholesterol Total 105 <=200 mg/dL    HDL Cholesterol 33 (L) 35 - 60 mg/dL    Triglyceride 125 34 - 140 mg/dL    Cholesterol/HDL Ratio 3 0 - 5    Very Low Density Lipoprotein 25     LDL Cholesterol 47.00 (L) 50.00 - 140.00 mg/dL   Hemoglobin A1C    Collection Time: 03/19/25  7:43 AM   Result Value Ref Range    Hemoglobin A1c 5.5 <=7.0 %    Estimated Average Glucose 111.2 mg/dL   TSH    Collection Time: 03/19/25  7:43 AM   Result Value Ref Range    TSH 1.492 0.350 - 4.940 uIU/mL   Microalbumin/Creatinine Ratio, Urine    Collection Time: 03/19/25  7:43 AM   Result Value Ref Range    Urine Microalbumin 40.9 (H) <=30.0 ug/mL    Urine Creatinine 210.3 (H) 63.0 - 166.0 mg/dL    Microalbumin Creatinine Ratio 19.4 0.0 - 30.0 mg/gm Cr   CBC with Differential    Collection Time: 03/19/25  7:43 AM   Result Value Ref Range    WBC 8.06 4.50 - 11.50 x10(3)/mcL    RBC 4.77 4.70 - 6.10 x10(6)/mcL    Hgb 13.9 (L) 14.0 - 18.0 g/dL    Hct 42.3 42.0 - 52.0 %    MCV 88.7 80.0 - 94.0 " fL    MCH 29.1 27.0 - 31.0 pg    MCHC 32.9 (L) 33.0 - 36.0 g/dL    RDW 13.5 11.5 - 17.0 %    Platelet 251 130 - 400 x10(3)/mcL    MPV 10.2 7.4 - 10.4 fL    Neut % 58.8 %    Lymph % 25.8 %    Mono % 7.3 %    Eos % 4.8 %    Basophil % 1.1 %    Imm Grans % 2.2 %    Neut # 4.73 2.1 - 9.2 x10(3)/mcL    Lymph # 2.08 0.6 - 4.6 x10(3)/mcL    Mono # 0.59 0.1 - 1.3 x10(3)/mcL    Eos # 0.39 0 - 0.9 x10(3)/mcL    Baso # 0.09 <=0.2 x10(3)/mcL    Imm Gran # 0.18 (H) 0.00 - 0.04 x10(3)/mcL    NRBC% 0.2 %      Assessment:     1. Fever, unspecified fever cause        Plan:   Negative flu   With only fever has been symptom discussed continuation of Tylenol and Motrin over-the-counter   We will return to work after 24 hours fever free otherwise we will treat as a viral illness encouraging plenty of rest fluids and fever reducing agents    Fever, unspecified fever cause  -     POCT Influenza A/B Molecular                         [1]   Social History  Tobacco Use    Smoking status: Former     Current packs/day: 0.00     Average packs/day: 1.5 packs/day for 23.2 years (34.7 ttl pk-yrs)     Types: Cigarettes     Start date: 2000     Quit date: 3/1/2023     Years since quittin.3    Smokeless tobacco: Never   Substance Use Topics    Alcohol use: Never    Drug use: Never

## 2025-07-09 ENCOUNTER — HOSPITAL ENCOUNTER (INPATIENT)
Facility: HOSPITAL | Age: 40
LOS: 1 days | Discharge: HOME | DRG: 866 | End: 2025-07-11
Attending: INTERNAL MEDICINE | Admitting: INTERNAL MEDICINE
Payer: COMMERCIAL

## 2025-07-09 DIAGNOSIS — R50.9 FEVER: ICD-10-CM

## 2025-07-09 DIAGNOSIS — N17.9 AKI (ACUTE KIDNEY INJURY): ICD-10-CM

## 2025-07-09 DIAGNOSIS — A41.9 SEPSIS, DUE TO UNSPECIFIED ORGANISM, UNSPECIFIED WHETHER ACUTE ORGAN DYSFUNCTION PRESENT: ICD-10-CM

## 2025-07-09 DIAGNOSIS — R00.0 TACHYCARDIA: ICD-10-CM

## 2025-07-09 DIAGNOSIS — D69.6 THROMBOCYTOPENIA: Primary | ICD-10-CM

## 2025-07-09 DIAGNOSIS — R07.9 CHEST PAIN: ICD-10-CM

## 2025-07-09 DIAGNOSIS — Z13.6 SCREENING FOR CARDIOVASCULAR CONDITION: ICD-10-CM

## 2025-07-09 LAB
ALBUMIN SERPL-MCNC: 3.9 G/DL (ref 3.5–5)
ALBUMIN/GLOB SERPL: 1.1 RATIO (ref 1.1–2)
ALP SERPL-CCNC: 77 UNIT/L (ref 40–150)
ALT SERPL-CCNC: 70 UNIT/L (ref 0–55)
ANION GAP SERPL CALC-SCNC: 15 MEQ/L
AST SERPL-CCNC: 60 UNIT/L (ref 11–45)
BACTERIA #/AREA URNS AUTO: ABNORMAL /HPF
BASOPHILS # BLD AUTO: 0.03 X10(3)/MCL
BASOPHILS NFR BLD AUTO: 0.9 %
BILIRUB SERPL-MCNC: 1.3 MG/DL
BILIRUB UR QL STRIP.AUTO: NEGATIVE
BUN SERPL-MCNC: 31.4 MG/DL (ref 8.9–20.6)
CALCIUM SERPL-MCNC: 9.1 MG/DL (ref 8.4–10.2)
CHLORIDE SERPL-SCNC: 99 MMOL/L (ref 98–107)
CLARITY UR: CLEAR
CO2 SERPL-SCNC: 22 MMOL/L (ref 22–29)
COLOR UR AUTO: YELLOW
CREAT SERPL-MCNC: 1.48 MG/DL (ref 0.72–1.25)
CREAT/UREA NIT SERPL: 21
EOSINOPHIL # BLD AUTO: 0.01 X10(3)/MCL (ref 0–0.9)
EOSINOPHIL NFR BLD AUTO: 0.3 %
ERYTHROCYTE [DISTWIDTH] IN BLOOD BY AUTOMATED COUNT: 13.7 % (ref 11.5–17)
FLUAV AG UPPER RESP QL IA.RAPID: NOT DETECTED
FLUBV AG UPPER RESP QL IA.RAPID: NOT DETECTED
GFR SERPLBLD CREATININE-BSD FMLA CKD-EPI: >60 ML/MIN/1.73/M2
GLOBULIN SER-MCNC: 3.4 GM/DL (ref 2.4–3.5)
GLUCOSE SERPL-MCNC: 118 MG/DL (ref 74–100)
GLUCOSE UR QL STRIP: NORMAL
HCT VFR BLD AUTO: 38.4 % (ref 42–52)
HGB BLD-MCNC: 13.6 G/DL (ref 14–18)
HGB UR QL STRIP: NEGATIVE
IMM GRANULOCYTES # BLD AUTO: 0.03 X10(3)/MCL (ref 0–0.04)
IMM GRANULOCYTES NFR BLD AUTO: 0.9 %
KETONES UR QL STRIP: NEGATIVE
LEUKOCYTE ESTERASE UR QL STRIP: NEGATIVE
LYMPHOCYTES # BLD AUTO: 0.72 X10(3)/MCL (ref 0.6–4.6)
LYMPHOCYTES NFR BLD AUTO: 21.8 %
MCH RBC QN AUTO: 28.3 PG (ref 27–31)
MCHC RBC AUTO-ENTMCNC: 35.4 G/DL (ref 33–36)
MCV RBC AUTO: 80 FL (ref 80–94)
MONOCYTES # BLD AUTO: 0.46 X10(3)/MCL (ref 0.1–1.3)
MONOCYTES NFR BLD AUTO: 13.9 %
MUCOUS THREADS URNS QL MICRO: ABNORMAL /LPF
NEUTROPHILS # BLD AUTO: 2.05 X10(3)/MCL (ref 2.1–9.2)
NEUTROPHILS NFR BLD AUTO: 62.2 %
NITRITE UR QL STRIP: NEGATIVE
NRBC BLD AUTO-RTO: 0 %
PH UR STRIP: 5.5 [PH]
PLATELET # BLD AUTO: 120 X10(3)/MCL (ref 130–400)
PLATELETS.RETICULATED NFR BLD AUTO: 5.2 % (ref 0.9–11.2)
PMV BLD AUTO: 10.6 FL (ref 7.4–10.4)
POTASSIUM SERPL-SCNC: 3.6 MMOL/L (ref 3.5–5.1)
PROT SERPL-MCNC: 7.3 GM/DL (ref 6.4–8.3)
PROT UR QL STRIP: ABNORMAL
RBC # BLD AUTO: 4.8 X10(6)/MCL (ref 4.7–6.1)
RBC #/AREA URNS AUTO: ABNORMAL /HPF
RSV A 5' UTR RNA NPH QL NAA+PROBE: NOT DETECTED
SARS-COV-2 RNA RESP QL NAA+PROBE: NOT DETECTED
SODIUM SERPL-SCNC: 136 MMOL/L (ref 136–145)
SP GR UR STRIP.AUTO: 1.03 (ref 1–1.03)
SQUAMOUS #/AREA URNS LPF: ABNORMAL /HPF
STREP A PCR (OHS): NOT DETECTED
UROBILINOGEN UR STRIP-ACNC: NORMAL
WBC # BLD AUTO: 3.3 X10(3)/MCL (ref 4.5–11.5)
WBC #/AREA URNS AUTO: ABNORMAL /HPF

## 2025-07-09 PROCEDURE — 81001 URINALYSIS AUTO W/SCOPE: CPT | Performed by: PHYSICIAN ASSISTANT

## 2025-07-09 PROCEDURE — 87651 STREP A DNA AMP PROBE: CPT | Performed by: PHYSICIAN ASSISTANT

## 2025-07-09 PROCEDURE — 80307 DRUG TEST PRSMV CHEM ANLYZR: CPT | Performed by: NURSE PRACTITIONER

## 2025-07-09 PROCEDURE — 80053 COMPREHEN METABOLIC PANEL: CPT | Performed by: PHYSICIAN ASSISTANT

## 2025-07-09 PROCEDURE — 85025 COMPLETE CBC W/AUTO DIFF WBC: CPT | Performed by: PHYSICIAN ASSISTANT

## 2025-07-09 PROCEDURE — 87637 SARSCOV2&INF A&B&RSV AMP PRB: CPT | Performed by: PHYSICIAN ASSISTANT

## 2025-07-09 PROCEDURE — 99285 EMERGENCY DEPT VISIT HI MDM: CPT | Mod: 25

## 2025-07-09 NOTE — FIRST PROVIDER EVALUATION
Medical screening examination initiated.  I have conducted a focused provider triage encounter, findings are as follows:    Brief history of present illness:  40 yo male presents to ED for evaluation of fever. Reports intermittent of fever of 103. Reports last dose of meds at 11 today. Reports vomiting. Denies any cough, congestion, abdominal pain.     Vitals:    07/09/25 1823   BP: 119/64   BP Location: Left arm   Pulse: (!) 119   Resp: 18   Temp: 99.4 °F (37.4 °C)   SpO2: 98%       Pertinent physical exam:  Patient is awake and alert and oriented.  Ambulatory to triage.  In no acute distress.      Brief workup plan:  Swabs, CXR, labs, UA    Preliminary workup initiated; this workup will be continued and followed by the physician or advanced practice provider that is assigned to the patient when roomed.

## 2025-07-10 LAB
ACCEPTIBLE SP GR UR QL: 1.03 (ref 1–1.03)
ALBUMIN SERPL-MCNC: 3.3 G/DL (ref 3.5–5)
ALBUMIN/GLOB SERPL: 1.1 RATIO (ref 1.1–2)
ALP SERPL-CCNC: 62 UNIT/L (ref 40–150)
ALT SERPL-CCNC: 54 UNIT/L (ref 0–55)
AMPHET UR QL SCN: NEGATIVE
ANION GAP SERPL CALC-SCNC: 8 MEQ/L
AST SERPL-CCNC: 44 UNIT/L (ref 11–45)
B PERT.PT PRMT NPH QL NAA+NON-PROBE: NOT DETECTED
BARBITURATE SCN PRESENT UR: NEGATIVE
BASOPHILS # BLD AUTO: 0.04 X10(3)/MCL
BASOPHILS NFR BLD AUTO: 1.3 %
BENZODIAZ UR QL SCN: NEGATIVE
BILIRUB SERPL-MCNC: 1.2 MG/DL
BUN SERPL-MCNC: 23.5 MG/DL (ref 8.9–20.6)
C PNEUM DNA NPH QL NAA+NON-PROBE: NOT DETECTED
CALCIUM SERPL-MCNC: 8.5 MG/DL (ref 8.4–10.2)
CANNABINOIDS UR QL SCN: NEGATIVE
CHLORIDE SERPL-SCNC: 103 MMOL/L (ref 98–107)
CK SERPL-CCNC: 327 U/L (ref 30–200)
CO2 SERPL-SCNC: 26 MMOL/L (ref 22–29)
COCAINE UR QL SCN: NEGATIVE
CREAT SERPL-MCNC: 1.19 MG/DL (ref 0.72–1.25)
CREAT/UREA NIT SERPL: 20
EOSINOPHIL # BLD AUTO: 0.02 X10(3)/MCL (ref 0–0.9)
EOSINOPHIL NFR BLD AUTO: 0.6 %
ERYTHROCYTE [DISTWIDTH] IN BLOOD BY AUTOMATED COUNT: 13.8 % (ref 11.5–17)
EST. AVERAGE GLUCOSE BLD GHB EST-MCNC: 157.1 MG/DL
FENTANYL UR QL SCN: NEGATIVE
GFR SERPLBLD CREATININE-BSD FMLA CKD-EPI: >60 ML/MIN/1.73/M2
GLOBULIN SER-MCNC: 2.9 GM/DL (ref 2.4–3.5)
GLUCOSE SERPL-MCNC: 142 MG/DL (ref 70–110)
GLUCOSE SERPL-MCNC: 179 MG/DL (ref 74–100)
HADV DNA NPH QL NAA+NON-PROBE: NOT DETECTED
HAV IGM SERPL QL IA: NONREACTIVE
HBA1C MFR BLD: 7.1 %
HBV CORE IGM SERPL QL IA: NONREACTIVE
HBV SURFACE AG SERPL QL IA: NONREACTIVE
HCOV 229E RNA NPH QL NAA+NON-PROBE: NOT DETECTED
HCOV HKU1 RNA NPH QL NAA+NON-PROBE: NOT DETECTED
HCOV NL63 RNA NPH QL NAA+NON-PROBE: NOT DETECTED
HCOV OC43 RNA NPH QL NAA+NON-PROBE: NOT DETECTED
HCT VFR BLD AUTO: 32.9 % (ref 42–52)
HCV AB SERPL QL IA: NONREACTIVE
HEMATOLOGIST REVIEW: NORMAL
HGB BLD-MCNC: 11.6 G/DL (ref 14–18)
HIV 1+2 AB+HIV1 P24 AG SERPL QL IA: NONREACTIVE
HMPV RNA NPH QL NAA+NON-PROBE: NOT DETECTED
HPIV1 RNA NPH QL NAA+NON-PROBE: NOT DETECTED
HPIV2 RNA NPH QL NAA+NON-PROBE: NOT DETECTED
HPIV3 RNA NPH QL NAA+NON-PROBE: NOT DETECTED
HPIV4 RNA NPH QL NAA+NON-PROBE: NOT DETECTED
IMM GRANULOCYTES # BLD AUTO: 0.03 X10(3)/MCL (ref 0–0.04)
IMM GRANULOCYTES NFR BLD AUTO: 1 %
LACTATE SERPL-SCNC: 1.6 MMOL/L (ref 0.5–2.2)
LYMPHOCYTES # BLD AUTO: 0.87 X10(3)/MCL (ref 0.6–4.6)
LYMPHOCYTES NFR BLD AUTO: 28.1 %
M PNEUMO DNA NPH QL NAA+NON-PROBE: NOT DETECTED
MAGNESIUM SERPL-MCNC: 1.9 MG/DL (ref 1.6–2.6)
MCH RBC QN AUTO: 28.2 PG (ref 27–31)
MCHC RBC AUTO-ENTMCNC: 35.3 G/DL (ref 33–36)
MCV RBC AUTO: 79.9 FL (ref 80–94)
MDMA UR QL SCN: NEGATIVE
MONOCYTES # BLD AUTO: 0.43 X10(3)/MCL (ref 0.1–1.3)
MONOCYTES NFR BLD AUTO: 13.9 %
MRSA PCR SCRN (OHS): NOT DETECTED
NEUTROPHILS # BLD AUTO: 1.71 X10(3)/MCL (ref 2.1–9.2)
NEUTROPHILS NFR BLD AUTO: 55.1 %
NRBC BLD AUTO-RTO: 0 %
OHS QRS DURATION: 88 MS
OHS QTC CALCULATION: 449 MS
OPIATES UR QL SCN: NEGATIVE
PCP UR QL: NEGATIVE
PH UR: 5.5 [PH] (ref 3–11)
PLATELET # BLD AUTO: 106 X10(3)/MCL (ref 130–400)
PMV BLD AUTO: 10.8 FL (ref 7.4–10.4)
POCT GLUCOSE: 134 MG/DL (ref 70–110)
POCT GLUCOSE: 142 MG/DL (ref 70–110)
POTASSIUM SERPL-SCNC: 3.5 MMOL/L (ref 3.5–5.1)
PROT SERPL-MCNC: 6.2 GM/DL (ref 6.4–8.3)
RBC # BLD AUTO: 4.12 X10(6)/MCL (ref 4.7–6.1)
RSV RNA NPH QL NAA+NON-PROBE: NOT DETECTED
RV+EV RNA NPH QL NAA+NON-PROBE: NOT DETECTED
SODIUM SERPL-SCNC: 137 MMOL/L (ref 136–145)
VANCOMYCIN SERPL-MCNC: 10.7 UG/ML (ref 15–20)
WBC # BLD AUTO: 3.1 X10(3)/MCL (ref 4.5–11.5)

## 2025-07-10 PROCEDURE — 63600175 PHARM REV CODE 636 W HCPCS: Performed by: PHYSICIAN ASSISTANT

## 2025-07-10 PROCEDURE — 83735 ASSAY OF MAGNESIUM: CPT | Performed by: NURSE PRACTITIONER

## 2025-07-10 PROCEDURE — 25000003 PHARM REV CODE 250: Performed by: PHYSICIAN ASSISTANT

## 2025-07-10 PROCEDURE — 93010 ELECTROCARDIOGRAM REPORT: CPT | Mod: ,,, | Performed by: INTERNAL MEDICINE

## 2025-07-10 PROCEDURE — 93005 ELECTROCARDIOGRAM TRACING: CPT

## 2025-07-10 PROCEDURE — 87389 HIV-1 AG W/HIV-1&-2 AB AG IA: CPT | Performed by: NURSE PRACTITIONER

## 2025-07-10 PROCEDURE — 80202 ASSAY OF VANCOMYCIN: CPT | Performed by: INTERNAL MEDICINE

## 2025-07-10 PROCEDURE — 87641 MR-STAPH DNA AMP PROBE: CPT | Performed by: NURSE PRACTITIONER

## 2025-07-10 PROCEDURE — 63600175 PHARM REV CODE 636 W HCPCS: Performed by: INTERNAL MEDICINE

## 2025-07-10 PROCEDURE — 87632 RESP VIRUS 6-11 TARGETS: CPT | Performed by: INTERNAL MEDICINE

## 2025-07-10 PROCEDURE — 63600175 PHARM REV CODE 636 W HCPCS: Performed by: NURSE PRACTITIONER

## 2025-07-10 PROCEDURE — 80053 COMPREHEN METABOLIC PANEL: CPT | Performed by: NURSE PRACTITIONER

## 2025-07-10 PROCEDURE — 87040 BLOOD CULTURE FOR BACTERIA: CPT | Performed by: PHYSICIAN ASSISTANT

## 2025-07-10 PROCEDURE — 85025 COMPLETE CBC W/AUTO DIFF WBC: CPT | Performed by: NURSE PRACTITIONER

## 2025-07-10 PROCEDURE — 25000003 PHARM REV CODE 250: Performed by: NURSE PRACTITIONER

## 2025-07-10 PROCEDURE — 83036 HEMOGLOBIN GLYCOSYLATED A1C: CPT | Performed by: NURSE PRACTITIONER

## 2025-07-10 PROCEDURE — 80074 ACUTE HEPATITIS PANEL: CPT | Performed by: NURSE PRACTITIONER

## 2025-07-10 PROCEDURE — 25000003 PHARM REV CODE 250: Performed by: INTERNAL MEDICINE

## 2025-07-10 PROCEDURE — 21400001 HC TELEMETRY ROOM

## 2025-07-10 PROCEDURE — 83605 ASSAY OF LACTIC ACID: CPT | Performed by: PHYSICIAN ASSISTANT

## 2025-07-10 PROCEDURE — 82550 ASSAY OF CK (CPK): CPT | Performed by: PHYSICIAN ASSISTANT

## 2025-07-10 PROCEDURE — 25500020 PHARM REV CODE 255: Performed by: PHYSICIAN ASSISTANT

## 2025-07-10 RX ORDER — ACETAMINOPHEN 500 MG
1000 TABLET ORAL EVERY 6 HOURS PRN
Status: DISCONTINUED | OUTPATIENT
Start: 2025-07-10 | End: 2025-07-11 | Stop reason: HOSPADM

## 2025-07-10 RX ORDER — PANTOPRAZOLE SODIUM 40 MG/1
40 TABLET, DELAYED RELEASE ORAL DAILY
Status: DISCONTINUED | OUTPATIENT
Start: 2025-07-11 | End: 2025-07-11 | Stop reason: HOSPADM

## 2025-07-10 RX ORDER — IBUPROFEN 200 MG
16 TABLET ORAL
Status: DISCONTINUED | OUTPATIENT
Start: 2025-07-10 | End: 2025-07-11 | Stop reason: HOSPADM

## 2025-07-10 RX ORDER — IBUPROFEN 200 MG
24 TABLET ORAL
Status: DISCONTINUED | OUTPATIENT
Start: 2025-07-10 | End: 2025-07-11 | Stop reason: HOSPADM

## 2025-07-10 RX ORDER — NALOXONE HCL 0.4 MG/ML
0.02 VIAL (ML) INJECTION
Status: DISCONTINUED | OUTPATIENT
Start: 2025-07-10 | End: 2025-07-11 | Stop reason: HOSPADM

## 2025-07-10 RX ORDER — SODIUM CHLORIDE 0.9 % (FLUSH) 0.9 %
10 SYRINGE (ML) INJECTION
Status: DISCONTINUED | OUTPATIENT
Start: 2025-07-10 | End: 2025-07-10

## 2025-07-10 RX ORDER — SODIUM CHLORIDE 9 MG/ML
INJECTION, SOLUTION INTRAVENOUS CONTINUOUS
Status: ACTIVE | OUTPATIENT
Start: 2025-07-10 | End: 2025-07-10

## 2025-07-10 RX ORDER — INSULIN ASPART 100 [IU]/ML
0-10 INJECTION, SOLUTION INTRAVENOUS; SUBCUTANEOUS
Status: DISCONTINUED | OUTPATIENT
Start: 2025-07-10 | End: 2025-07-11 | Stop reason: HOSPADM

## 2025-07-10 RX ORDER — TALC
6 POWDER (GRAM) TOPICAL NIGHTLY PRN
Status: DISCONTINUED | OUTPATIENT
Start: 2025-07-10 | End: 2025-07-11 | Stop reason: HOSPADM

## 2025-07-10 RX ORDER — AMOXICILLIN 250 MG
1 CAPSULE ORAL 2 TIMES DAILY PRN
Status: DISCONTINUED | OUTPATIENT
Start: 2025-07-10 | End: 2025-07-11 | Stop reason: HOSPADM

## 2025-07-10 RX ORDER — BISACODYL 10 MG/1
10 SUPPOSITORY RECTAL DAILY PRN
Status: DISCONTINUED | OUTPATIENT
Start: 2025-07-10 | End: 2025-07-11 | Stop reason: HOSPADM

## 2025-07-10 RX ORDER — ONDANSETRON HYDROCHLORIDE 2 MG/ML
4 INJECTION, SOLUTION INTRAVENOUS EVERY 4 HOURS PRN
Status: DISCONTINUED | OUTPATIENT
Start: 2025-07-10 | End: 2025-07-11 | Stop reason: HOSPADM

## 2025-07-10 RX ORDER — METFORMIN HYDROCHLORIDE 500 MG/1
500 TABLET ORAL
Status: ON HOLD | COMMUNITY
End: 2025-07-11

## 2025-07-10 RX ORDER — GLUCAGON 1 MG
1 KIT INJECTION
Status: DISCONTINUED | OUTPATIENT
Start: 2025-07-10 | End: 2025-07-11 | Stop reason: HOSPADM

## 2025-07-10 RX ORDER — ALUMINUM HYDROXIDE, MAGNESIUM HYDROXIDE, AND SIMETHICONE 1200; 120; 1200 MG/30ML; MG/30ML; MG/30ML
30 SUSPENSION ORAL 4 TIMES DAILY PRN
Status: DISCONTINUED | OUTPATIENT
Start: 2025-07-10 | End: 2025-07-11 | Stop reason: HOSPADM

## 2025-07-10 RX ORDER — PROCHLORPERAZINE EDISYLATE 5 MG/ML
5 INJECTION INTRAMUSCULAR; INTRAVENOUS EVERY 6 HOURS PRN
Status: DISCONTINUED | OUTPATIENT
Start: 2025-07-10 | End: 2025-07-11 | Stop reason: HOSPADM

## 2025-07-10 RX ORDER — LOSARTAN POTASSIUM 50 MG/1
50 TABLET ORAL DAILY
Status: DISCONTINUED | OUTPATIENT
Start: 2025-07-11 | End: 2025-07-11 | Stop reason: HOSPADM

## 2025-07-10 RX ORDER — ATORVASTATIN CALCIUM 10 MG/1
20 TABLET, FILM COATED ORAL DAILY
Status: DISCONTINUED | OUTPATIENT
Start: 2025-07-11 | End: 2025-07-11 | Stop reason: HOSPADM

## 2025-07-10 RX ORDER — ENOXAPARIN SODIUM 100 MG/ML
40 INJECTION SUBCUTANEOUS EVERY 24 HOURS
Status: DISCONTINUED | OUTPATIENT
Start: 2025-07-10 | End: 2025-07-11 | Stop reason: HOSPADM

## 2025-07-10 RX ADMIN — PIPERACILLIN SODIUM AND TAZOBACTAM SODIUM 4.5 G: 4; .5 INJECTION, POWDER, LYOPHILIZED, FOR SOLUTION INTRAVENOUS at 06:07

## 2025-07-10 RX ADMIN — IOHEXOL 100 ML: 350 INJECTION, SOLUTION INTRAVENOUS at 12:07

## 2025-07-10 RX ADMIN — SODIUM CHLORIDE: 9 INJECTION, SOLUTION INTRAVENOUS at 01:07

## 2025-07-10 RX ADMIN — PIPERACILLIN SODIUM AND TAZOBACTAM SODIUM 4.5 G: 4; .5 INJECTION, POWDER, LYOPHILIZED, FOR SOLUTION INTRAVENOUS at 02:07

## 2025-07-10 RX ADMIN — PIPERACILLIN SODIUM AND TAZOBACTAM SODIUM 4.5 G: 4; .5 INJECTION, POWDER, LYOPHILIZED, FOR SOLUTION INTRAVENOUS at 09:07

## 2025-07-10 RX ADMIN — VANCOMYCIN HYDROCHLORIDE 1000 MG: 1 INJECTION, POWDER, LYOPHILIZED, FOR SOLUTION INTRAVENOUS at 03:07

## 2025-07-10 RX ADMIN — VANCOMYCIN HYDROCHLORIDE 1000 MG: 1 INJECTION, POWDER, LYOPHILIZED, FOR SOLUTION INTRAVENOUS at 05:07

## 2025-07-10 RX ADMIN — SODIUM CHLORIDE, POTASSIUM CHLORIDE, SODIUM LACTATE AND CALCIUM CHLORIDE 3000 ML: 600; 310; 30; 20 INJECTION, SOLUTION INTRAVENOUS at 12:07

## 2025-07-10 RX ADMIN — VANCOMYCIN HYDROCHLORIDE 1500 MG: 1.5 INJECTION, POWDER, LYOPHILIZED, FOR SOLUTION INTRAVENOUS at 04:07

## 2025-07-10 RX ADMIN — ACETAMINOPHEN 1000 MG: 500 TABLET ORAL at 07:07

## 2025-07-10 RX ADMIN — ENOXAPARIN SODIUM 40 MG: 40 INJECTION SUBCUTANEOUS at 04:07

## 2025-07-10 NOTE — PROGRESS NOTES
Pharmacokinetic Initial Assessment: IV Vancomycin    Assessment/Plan:    Initiate intravenous vancomycin with loading dose of 2000 mg once followed by a maintenance dose of vancomycin 1500 mg IV every 12 hours  Desired empiric serum trough concentration is 15 to 20 mcg/mL  Draw vancomycin trough level 60 min prior to fourth dose on 07/11/25 at approximately 1500  Pharmacy will continue to follow and monitor vancomycin.      Please contact pharmacy at extension [phone] with any questions regarding this assessment.     Thank you for the consult,   Rebeca Morales       Patient brief summary:  Wilman Cano is a 39 y.o. male initiated on antimicrobial therapy with IV Vancomycin for treatment of suspected sepsis    Drug Allergies:   Review of patient's allergies indicates:   Allergen Reactions    Penicillins        Actual Body Weight:   100.7 kg    Renal Function:   Estimated Creatinine Clearance: 96.5 mL/min (based on SCr of 1.19 mg/dL).,     Dialysis Method (if applicable):  N/A    CBC (last 72 hours):  Recent Labs   Lab Result Units 07/09/25  1940 07/10/25  0659   WBC x10(3)/mcL 3.30* 3.10*   Hgb g/dL 13.6* 11.6*   Hemoglobin A1c %  --  7.1*   Hct % 38.4* 32.9*   Platelet x10(3)/mcL 120* 106*   Mono % % 13.9 13.9   Eos % % 0.3 0.6   Basophil % % 0.9 1.3       Metabolic Panel (last 72 hours):  Recent Labs   Lab Result Units 07/09/25 1940 07/09/25  2138 07/10/25  0659   Sodium mmol/L 136  --  137   Potassium mmol/L 3.6  --  3.5   Chloride mmol/L 99  --  103   CO2 mmol/L 22  --  26   Glucose mg/dL 118*  --  179*   Glucose, UA   --  Normal  --    Blood Urea Nitrogen mg/dL 31.4*  --  23.5*   Creatinine mg/dL 1.48*  --  1.19   Albumin g/dL 3.9  --  3.3*   Bilirubin Total mg/dL 1.3  --  1.2   ALP unit/L 77  --  62   AST unit/L 60*  --  44   ALT unit/L 70*  --  54   Magnesium Level mg/dL  --   --  1.90       Drug levels (last 3 results):  Recent Labs   Lab Result Units 07/10/25  1310   Vancomycin Random ug/ml 10.7*        Microbiologic Results:  Microbiology Results (last 7 days)       Procedure Component Value Units Date/Time    Blood culture x two cultures. Draw prior to antibiotics. [[id number]] Collected: 07/10/25 0153    Order Status: Resulted Specimen: Blood from Arm, Right Updated: 07/10/25 0203    Blood culture x two cultures. Draw prior to antibiotics. [[id number]] Collected: 07/10/25 0152    Order Status: Resulted Specimen: Blood from Arm, Left Updated: 07/10/25 0203

## 2025-07-10 NOTE — ED NOTES
Pt Contact for Blood Draw. Pt alert and oriented at this time. Pt updated accordingly. Pt does not appear to be in any immediate distress at this time.

## 2025-07-10 NOTE — ED PROVIDER NOTES
Encounter Date: 7/9/2025       History     Chief Complaint   Patient presents with    Fever    Emesis     Pt c/o intermittent fever (103F)  x 3 days, denies cough,congestion, sore throat or abdominal pain.  Was seen at an urgent care yesterday with neg swabs.Last tylenol 11am.  PMH DM     See MDM for details.      The history is provided by the patient. No  was used.     Review of patient's allergies indicates:   Allergen Reactions    Penicillins      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    Hypertension     Known health problems: none      Past Surgical History:   Procedure Laterality Date    NO PAST SURGERIES       Family History   Problem Relation Name Age of Onset    Hypertension Mother KENDRICK DELVALLE     Hypertension Father ELMIRA MEDRANO     Diabetes Father ELMIRA MEDRANO     No Known Problems Sister      No Known Problems Brother      Emphysema Maternal Grandmother       Social History[1]  Review of Systems   Constitutional:  Positive for fever. Negative for activity change, appetite change, diaphoresis and fatigue.   HENT:  Negative for rhinorrhea and sinus pressure.    Eyes: Negative.    Respiratory: Negative.  Negative for chest tightness.    Cardiovascular:  Negative for chest pain.   Gastrointestinal: Negative.  Negative for abdominal distention and abdominal pain.   Endocrine: Negative.    Genitourinary: Negative.    Musculoskeletal: Negative.  Negative for arthralgias.   Allergic/Immunologic: Negative.    Neurological:  Negative for dizziness and headaches.   Hematological: Negative.    Psychiatric/Behavioral: Negative.     All other systems reviewed and are negative.      Physical Exam     Initial Vitals [07/09/25 1823]   BP Pulse Resp Temp SpO2   119/64 (!) 119 18 99.4 °F (37.4 °C) 98 %      MAP       --         Physical Exam    Vitals reviewed.  Constitutional: He appears well-developed and well-nourished. He is  cooperative. No distress.   HENT:   Head: Normocephalic and atraumatic. Not macrocephalic.   Right Ear: Tympanic membrane and external ear normal. Tympanic membrane is not erythematous.   Left Ear: Tympanic membrane normal. Tympanic membrane is not erythematous.   Nose: No mucosal edema. Right sinus exhibits no frontal sinus tenderness. Left sinus exhibits no frontal sinus tenderness. Mouth/Throat: Oropharynx is clear and moist and mucous membranes are normal. No oropharyngeal exudate.   Eyes: Conjunctivae and EOM are normal. Pupils are equal, round, and reactive to light. Right eye exhibits no discharge. Left eye exhibits no discharge.   Neck: Neck supple. No tracheal deviation present.   Normal range of motion.  Cardiovascular:  Normal rate and regular rhythm.           Pulmonary/Chest: Effort normal and breath sounds normal. No respiratory distress. He has no decreased breath sounds. He has no wheezes. He has no rhonchi. He has no rales.   Abdominal: Abdomen is soft. Bowel sounds are normal. He exhibits no distension and no mass. There is no abdominal tenderness. There is no rebound and no guarding.   Musculoskeletal:         General: Normal range of motion.      Cervical back: Normal range of motion and neck supple.     Lymphadenopathy:        Head (right side): No submental adenopathy present.        Head (left side): No submental adenopathy present.     He has no cervical adenopathy.   Neurological: He is alert and oriented to person, place, and time. He has normal strength. No cranial nerve deficit. GCS score is 15. GCS eye subscore is 4. GCS verbal subscore is 5. GCS motor subscore is 6.   Skin: Skin is warm.   Psychiatric: He has a normal mood and affect. His behavior is normal. Judgment and thought content normal.         ED Course   Procedures  Labs Reviewed   COMPREHENSIVE METABOLIC PANEL - Abnormal       Result Value    Sodium 136      Potassium 3.6      Chloride 99      CO2 22      Glucose 118 (*)      Blood Urea Nitrogen 31.4 (*)     Creatinine 1.48 (*)     Calcium 9.1      Protein Total 7.3      Albumin 3.9      Globulin 3.4      Albumin/Globulin Ratio 1.1      Bilirubin Total 1.3      ALP 77      ALT 70 (*)     AST 60 (*)     eGFR >60      Anion Gap 15.0      BUN/Creatinine Ratio 21     URINALYSIS, REFLEX TO URINE CULTURE - Abnormal    Color, UA Yellow      Appearance, UA Clear      Specific Gravity, UA 1.028      pH, UA 5.5      Protein, UA 1+ (*)     Glucose, UA Normal      Ketones, UA Negative      Blood, UA Negative      Bilirubin, UA Negative      Urobilinogen, UA Normal      Nitrites, UA Negative      Leukocyte Esterase, UA Negative      RBC, UA 0-5      WBC, UA 0-5      Bacteria, UA None Seen      Squamous Epithelial Cells, UA Trace      Mucous, UA Trace (*)    CBC WITH DIFFERENTIAL - Abnormal    WBC 3.30 (*)     RBC 4.80      Hgb 13.6 (*)     Hct 38.4 (*)     MCV 80.0      MCH 28.3      MCHC 35.4      RDW 13.7      Platelet 120 (*)     MPV 10.6 (*)     IPF 5.2      Neut % 62.2      Lymph % 21.8      Mono % 13.9      Eos % 0.3      Basophil % 0.9      Imm Grans % 0.9      Neut # 2.05 (*)     Lymph # 0.72      Mono # 0.46      Eos # 0.01      Baso # 0.03      Imm Gran # 0.03      NRBC% 0.0     COVID/RSV/FLU A&B PCR - Normal    Influenza A PCR Not Detected      Influenza B PCR Not Detected      Respiratory Syncytial Virus PCR Not Detected      SARS-CoV-2 PCR Not Detected      Narrative:     The Xpert Xpress SARS-CoV-2/FLU/RSV plus is a rapid, multiplexed real-time PCR test intended for the simultaneous qualitative detection and differentiation of SARS-CoV-2, Influenza A, Influenza B, and respiratory syncytial virus (RSV) viral RNA in either nasopharyngeal swab or nasal swab specimens.         STREP GROUP A BY PCR - Normal    STREP A PCR ([facility]) Not Detected      Narrative:     The Xpert Xpress Strep A test is a rapid, qualitative in vitro diagnostic test for the detection of Streptococcus pyogenes (Group A  ß-hemolytic Streptococcus, Strep A) in throat swab specimens from patients with signs and symptoms of pharyngitis.     BLOOD CULTURE [facility]   BLOOD CULTURE [facility]   CBC W/ AUTO DIFFERENTIAL    Narrative:     The following orders were created for panel order CBC auto differential.  Procedure                               Abnormality         Status                     ---------                               -----------         ------                     CBC with Differential[[id number]]       Abnormal            Final result                 Please view results for these tests on the individual orders.   CK   LACTIC ACID, PLASMA   DRUG SCREEN, URINE (BEAKER)   HEPATITIS PANEL, ACUTE          Imaging Results              CT Chest Abdomen Pelvis With IV Contrast (XPD) NO Oral Contrast (In process)                      X-Ray Chest 1 View (Final result)  Result time 07/09/25 19:20:24      Final result by Lonnie Deluna MD (07/09/25 19:20:24)                   Impression:      No acute pulmonary process identified.      Electronically signed by: Lonnie Deluna  Date:    07/09/2025  Time:    19:20               Narrative:    EXAMINATION:  XR CHEST 1 VIEW    CLINICAL HISTORY:  Fever, unspecified    TECHNIQUE:  Frontal view(s) of the chest.    COMPARISON:  No relevant comparison studies available at the time of dictation.    FINDINGS:  Normal cardiac silhouette.  The lungs are well-inflated.  No consolidation identified.  No significant pleural effusion or discernible pneumothorax.                                       Medications   lactated ringers bolus 3,021 mL (has no administration in time range)   piperacillin-tazobactam (ZOSYN) 4.5 g in D5W 100 mL IVPB (MB+) (has no administration in time range)   0.9% NaCl infusion (has no administration in time range)   vancomycin (VANCOCIN) 1,000 mg in D5W 250 mL IVPB (admixture device) (has no administration in time range)     Followed by   vancomycin (VANCOCIN) 1,000 mg in D5W 250 mL  IVPB (admixture device) (has no administration in time range)   iohexoL (OMNIPAQUE 350) injection 100 mL (100 mLs Intravenous Given 7/10/25 0009)     Medical Decision Making  39yoWM w/hx of HTN, DM2, and psoriasis presents to the emergency department for fever for the last 3 days.  Patient has had intermittent back pain only when he has fever.  Patient denies cough, congestion, sore throat, chest pain, or abdominal pain.  T-max at home is 104 F. fever responds to Tylenol and ibuprofen.  Having decreased urine output.  Of note he was out in the sun over the weekend, otherwise no recent exposure to sick contacts or increase physical activity.    Problems Addressed:  Fever: acute illness or injury with systemic symptoms     Details: Differential diagnosis included but not limited to:  COVID, flu, strep, UTI, intra-abdominal infection, sepsis, neutropenic fever, dehydration, rhabdomyolysis, other etiology    Patient has no leukocytosis, but mild neutropenia.  BUN and creatinine are elevated which suggests REGULO.  Further imaging with CT chest abdomen and pelvis ordered to determine if there is a possible source for the infection.  Patient was placed on broad-spectrum antibiotics for sepsis of unknown source initially in the emergency department with daughter evaluation. Dr. Myles agrees with assessment. MITRA Choi with Hospital Medicine accepts admission for this patient.  Patient and family member at bedside agreeable to admission.  We will keep him for further evaluation for possible sepsis.    Amount and/or Complexity of Data Reviewed  Labs: ordered. Decision-making details documented in ED Course.  Radiology: ordered. Decision-making details documented in ED Course.    Risk  Prescription drug management.  Decision regarding hospitalization.                                      Clinical Impression:  Final diagnoses:  [R50.9] Fever  [Z13.6] Screening for cardiovascular condition  [D69.6] Thrombocytopenia  (Primary)  [A41.9] Sepsis, due to unspecified organism, unspecified whether acute organ dysfunction present  [N17.9] REGULO (acute kidney injury)          ED Disposition Condition    Admit              This note was typed partially using voice recognition software.  Please be reminded that not all corrections/addendums to grammar may have been made prior to closing of this chart.         Elizabeth Mahan PA  07/10/25 0024         [1]   Social History  Tobacco Use    Smoking status: Former     Current packs/day: 0.00     Average packs/day: 1.5 packs/day for 23.2 years (34.7 ttl pk-yrs)     Types: Cigarettes     Start date: 2000     Quit date: 3/1/2023     Years since quittin.3    Smokeless tobacco: Never   Substance Use Topics    Alcohol use: Never    Drug use: Never        Elizabeth Mahan PA  07/10/25 0025

## 2025-07-10 NOTE — PROGRESS NOTES
"Pharmacokinetic Initial Assessment: IV Vancomycin    Assessment/Plan:    Initiate intravenous vancomycin with loading dose of 2000 mg once with subsequent doses when random concentrations are less than 20 mcg/mL  Desired empiric serum trough concentration is 15 to 20 mcg/mL  Draw vancomycin random level on 07/11 at 0430.  MRSA PCR ordered and pending  Pharmacy will continue to follow and monitor vancomycin.      Please contact pharmacy at extension [phone] with any questions regarding this assessment.     Thank you for the consult,   Elisa Guerra       Patient brief summary:  Wilman Cano is a 39 y.o. male initiated on antimicrobial therapy with IV Vancomycin for treatment of suspected sepsis    Drug Allergies:   Review of patient's allergies indicates:   Allergen Reactions    Penicillins        Actual Body Weight:   Wt Readings from Last 1 Encounters:   07/10/25 100.7 kg (222 lb 0.1 oz)       Renal Function:   Estimated Creatinine Clearance: 77.6 mL/min (A) (based on SCr of 1.48 mg/dL (H)).,     Dialysis Method (if applicable):  N/A    CBC (last 72 hours):  Recent Labs   Lab Result Units 07/09/25  1940   WBC x10(3)/mcL 3.30*   Hgb g/dL 13.6*   Hct % 38.4*   Platelet x10(3)/mcL 120*   Mono % % 13.9   Eos % % 0.3   Basophil % % 0.9       Metabolic Panel (last 72 hours):  Recent Labs   Lab Result Units 07/09/25 1940 07/09/25  2138   Sodium mmol/L 136  --    Potassium mmol/L 3.6  --    Chloride mmol/L 99  --    CO2 mmol/L 22  --    Glucose mg/dL 118*  --    Glucose, UA   --  Normal   Blood Urea Nitrogen mg/dL 31.4*  --    Creatinine mg/dL 1.48*  --    Albumin g/dL 3.9  --    Bilirubin Total mg/dL 1.3  --    ALP unit/L 77  --    AST unit/L 60*  --    ALT unit/L 70*  --        Drug levels (last 3 results):  No results for input(s): "VANCOMYCINRA", "VANCORANDOM", "VANCOMYCINPE", "VANCOPEAK", "VANCOMYCINTR", "VANCOTROUGH" in the last 72 hours.    Microbiologic Results:  Microbiology Results (last 7 days)       Procedure " Component Value Units Date/Time    Blood culture x two cultures. Draw prior to antibiotics. [[id number]]     Order Status: Sent Specimen: Blood     Blood culture x two cultures. Draw prior to antibiotics. [[id number]]     Order Status: Sent Specimen: Blood

## 2025-07-11 VITALS
DIASTOLIC BLOOD PRESSURE: 77 MMHG | TEMPERATURE: 99 F | HEART RATE: 83 BPM | BODY MASS INDEX: 31.4 KG/M2 | SYSTOLIC BLOOD PRESSURE: 123 MMHG | RESPIRATION RATE: 17 BRPM | HEIGHT: 69 IN | OXYGEN SATURATION: 98 % | WEIGHT: 212 LBS

## 2025-07-11 PROBLEM — N17.9 AKI (ACUTE KIDNEY INJURY): Status: ACTIVE | Noted: 2025-07-11

## 2025-07-11 LAB
ABS NEUT (OLG): 1.3 X10(3)/MCL (ref 2.1–9.2)
ALBUMIN SERPL-MCNC: 3.5 G/DL (ref 3.5–5)
ALBUMIN/GLOB SERPL: 1.1 RATIO (ref 1.1–2)
ALP SERPL-CCNC: 76 UNIT/L (ref 40–150)
ALT SERPL-CCNC: 58 UNIT/L (ref 0–55)
ANION GAP SERPL CALC-SCNC: 11 MEQ/L
AST SERPL-CCNC: 44 UNIT/L (ref 11–45)
BILIRUB SERPL-MCNC: 1 MG/DL
BUN SERPL-MCNC: 12.3 MG/DL (ref 8.9–20.6)
CALCIUM SERPL-MCNC: 9.4 MG/DL (ref 8.4–10.2)
CHLORIDE SERPL-SCNC: 105 MMOL/L (ref 98–107)
CK SERPL-CCNC: 213 U/L (ref 30–200)
CO2 SERPL-SCNC: 27 MMOL/L (ref 22–29)
CREAT SERPL-MCNC: 0.88 MG/DL (ref 0.72–1.25)
CREAT/UREA NIT SERPL: 14
EOSINOPHIL NFR BLD MANUAL: 0.06 X10(3)/MCL (ref 0–0.9)
EOSINOPHIL NFR BLD MANUAL: 2 %
ERYTHROCYTE [DISTWIDTH] IN BLOOD BY AUTOMATED COUNT: 13.5 % (ref 11.5–17)
GFR SERPLBLD CREATININE-BSD FMLA CKD-EPI: >60 ML/MIN/1.73/M2
GLOBULIN SER-MCNC: 3.2 GM/DL (ref 2.4–3.5)
GLUCOSE SERPL-MCNC: 131 MG/DL (ref 74–100)
HCT VFR BLD AUTO: 34.9 % (ref 42–52)
HGB BLD-MCNC: 12.1 G/DL (ref 14–18)
INSTRUMENT WBC (OLG): 3.18 X10(3)/MCL
LYMPHOCYTES NFR BLD MANUAL: 1.34 X10(3)/MCL (ref 0.6–4.6)
LYMPHOCYTES NFR BLD MANUAL: 42 %
MCH RBC QN AUTO: 27.9 PG (ref 27–31)
MCHC RBC AUTO-ENTMCNC: 34.7 G/DL (ref 33–36)
MCV RBC AUTO: 80.4 FL (ref 80–94)
MICROCYTES BLD QL SMEAR: ABNORMAL
MONOCYTES NFR BLD MANUAL: 0.48 X10(3)/MCL (ref 0.1–1.3)
MONOCYTES NFR BLD MANUAL: 15 %
NEUTROPHILS NFR BLD MANUAL: 41 %
PATH REV: NORMAL
PLATELET # BLD AUTO: 138 X10(3)/MCL (ref 130–400)
PLATELET # BLD EST: NORMAL 10*3/UL
PLATELETS.RETICULATED NFR BLD AUTO: 4.2 % (ref 0.9–11.2)
PMV BLD AUTO: 10.8 FL (ref 7.4–10.4)
POCT GLUCOSE: 113 MG/DL (ref 70–110)
POCT GLUCOSE: 195 MG/DL (ref 70–110)
POCT GLUCOSE: 97 MG/DL (ref 70–110)
POIKILOCYTOSIS BLD QL SMEAR: ABNORMAL
POTASSIUM SERPL-SCNC: 3.4 MMOL/L (ref 3.5–5.1)
PROT SERPL-MCNC: 6.7 GM/DL (ref 6.4–8.3)
RBC # BLD AUTO: 4.34 X10(6)/MCL (ref 4.7–6.1)
RBC MORPH BLD: ABNORMAL
SODIUM SERPL-SCNC: 143 MMOL/L (ref 136–145)
WBC # BLD AUTO: 3.18 X10(3)/MCL (ref 4.5–11.5)

## 2025-07-11 PROCEDURE — 80053 COMPREHEN METABOLIC PANEL: CPT | Performed by: INTERNAL MEDICINE

## 2025-07-11 PROCEDURE — 85025 COMPLETE CBC W/AUTO DIFF WBC: CPT | Performed by: INTERNAL MEDICINE

## 2025-07-11 PROCEDURE — 36415 COLL VENOUS BLD VENIPUNCTURE: CPT | Performed by: INTERNAL MEDICINE

## 2025-07-11 PROCEDURE — 82550 ASSAY OF CK (CPK): CPT | Performed by: INTERNAL MEDICINE

## 2025-07-11 PROCEDURE — 25000003 PHARM REV CODE 250: Performed by: INTERNAL MEDICINE

## 2025-07-11 RX ORDER — METFORMIN HYDROCHLORIDE 500 MG/1
500 TABLET ORAL 2 TIMES DAILY WITH MEALS
Qty: 60 TABLET | Refills: 0 | Status: SHIPPED | OUTPATIENT
Start: 2025-07-11 | End: 2025-07-24 | Stop reason: SDUPTHER

## 2025-07-11 RX ORDER — POTASSIUM CHLORIDE 20 MEQ/1
40 TABLET, EXTENDED RELEASE ORAL ONCE
Status: COMPLETED | OUTPATIENT
Start: 2025-07-11 | End: 2025-07-11

## 2025-07-11 RX ORDER — INSULIN PUMP SYRINGE, 3 ML
EACH MISCELLANEOUS
Qty: 1 EACH | Refills: 0 | Status: SHIPPED | OUTPATIENT
Start: 2025-07-11 | End: 2026-07-11

## 2025-07-11 RX ORDER — LANCETS
1 EACH MISCELLANEOUS 2 TIMES DAILY
Qty: 100 EACH | Refills: 0 | Status: SHIPPED | OUTPATIENT
Start: 2025-07-11

## 2025-07-11 RX ADMIN — ATORVASTATIN CALCIUM 20 MG: 10 TABLET, FILM COATED ORAL at 08:07

## 2025-07-11 RX ADMIN — PANTOPRAZOLE SODIUM 40 MG: 40 TABLET, DELAYED RELEASE ORAL at 08:07

## 2025-07-11 RX ADMIN — POTASSIUM CHLORIDE EXTENDED-RELEASE 40 MEQ: 1500 TABLET ORAL at 01:07

## 2025-07-11 RX ADMIN — LOSARTAN POTASSIUM 50 MG: 50 TABLET, FILM COATED ORAL at 08:07

## 2025-07-11 NOTE — PLAN OF CARE
Problem: Adult Inpatient Plan of Care  Goal: Plan of Care Review  Outcome: Met  Goal: Patient-Specific Goal (Individualized)  Outcome: Met  Goal: Absence of Hospital-Acquired Illness or Injury  Outcome: Met  Goal: Optimal Comfort and Wellbeing  Outcome: Met  Goal: Readiness for Transition of Care  Outcome: Met     Problem: Diabetes Comorbidity  Goal: Blood Glucose Level Within Targeted Range  Outcome: Met     Problem: Fever  Goal: Body Temperature in Desired Range  Outcome: Met     Problem: Acute Kidney Injury/Impairment  Goal: Fluid and Electrolyte Balance  Outcome: Met  Goal: Improved Oral Intake  Outcome: Met  Goal: Effective Renal Function  Outcome: Met

## 2025-07-11 NOTE — PLAN OF CARE
07/11/25 1510   Discharge Assessment   Assessment Type Discharge Planning Assessment   Confirmed/corrected address, phone number and insurance Yes   Confirmed Demographics Correct on Facesheet   Communicated DAMARIS with patient/caregiver Date not available/Unable to determine   People in Home spouse;child(sammi), dependent   Name(s) of People in Home Nikki and two minor children   Do you expect to return to your current living situation? Yes   Do you have help at home or someone to help you manage your care at home? Yes   Who are your caregiver(s) and their phone number(s)? Nikki   Prior to hospitilization cognitive status: Unable to Assess   Current cognitive status: Alert/Oriented   Walking or Climbing Stairs Difficulty no   Dressing/Bathing Difficulty no   Home Layout Able to live on 1st floor   Equipment Currently Used at Home none   Patient currently being followed by outpatient case management? No   Do you currently have service(s) that help you manage your care at home? No   Do you have prescription coverage? Yes   Who is going to help you get home at discharge? spouse   How do you get to doctors appointments? family or friend will provide;car, drives self   Are you on dialysis? No   Do you take coumadin? No   Discharge Plan A Home with family   Discharge Plan B Home with family   DME Needed Upon Discharge  none   Discharge Plan discussed with: Patient;Spouse/sig other   Transition of Care Barriers None     No needs identified.     PCP Alfonso Gardner MD

## 2025-07-11 NOTE — CONSULTS
Nutrition Education:    Patient educated on: Diabetic Diet.      Teaching Method:  Demonstration, Explanation, and Printed Materials    Patient's Understanding: Verbalizes understanding    Barriers to learning: None evident    Expected Compliance:  good    All questions were answered. Dietitian's contact information provided.

## 2025-07-11 NOTE — H&P
[facility] Hospital Medicine History & Physical Examination       Patient Name: Wilman Cano  MRN: [id number]  Patient Class: IP- Inpatient   Admission Date: 7/9/2025   Admitting Physician: SHARDA Service   Length of Stay: 0  Attending Physician: Effie Singh MD  Primary Care Provider: Alfonso Gardner MD  Face-to-Face encounter date: 07/10/2025  Chief Complaint: Fever and Emesis (Pt c/o intermittent fever (103F)  x 3 days, denies cough,congestion, sore throat or abdominal pain.  Was seen at an urgent care yesterday with neg swabs.Last tylenol 11am.  PMH DM)      Screening for Social Drivers for health:  Patient screened for food insecurity, housing instability, transportation needs, utility difficulties, and interpersonal safety (select all that apply as identified as concern)  []Housing or Food  []Transportation Needs  []Utility Difficulties  []Interpersonal safety  [x]None      Patient information was obtained from patient, patient's family, past medical records and ER records.  ED records were reviewed in detail and documented below    HISTORY OF PRESENT ILLNESS:   39-year-old  male with significant history of HTN, HLD, type 2 diabetes mellitus, GERD, chronic tobacco use, psoriasis recently initiated on Skyrizi which he has completed 2 doses on a biweekly frequency.  Patient presented to the ED with complaints of high-grade temp spikes at own as cis 103° F. he denied any other symptoms including nausea,  diarrhea, shortness a breath, cough or any sick contacts.  Did have 2 episodes of vomiting in the past 24 hours. Slightly tachycardic in the ED, otherwise hemodynamics stable.  Labs were significant for mild pancytopenia.  And transaminitis, UDS was negative, he tested negative for influenza, hepatitis, MRSA PCR, HIV, RSV and COVID-19 testing also was negative.  CT chest, abdomen, pelvis with no acute abnormality, hospital medicine services consulted for admission, patient  received broad-spectrum antibiotics in the ED      PAST MEDICAL HISTORY:     HTN  HLD  Type 2 diabetes mellitus  GERD   Psoriasis    PAST SURGICAL HISTORY:     Past Surgical History:   Procedure Laterality Date    NO PAST SURGERIES         ALLERGIES:   Penicillins    FAMILY HISTORY:   Reviewed and negative    SOCIAL HISTORY:     Social History     Tobacco Use    Smoking status: Former     Current packs/day: 0.00     Average packs/day: 1.5 packs/day for 23.2 years (34.7 ttl pk-yrs)     Types: Cigarettes     Start date: 2000     Quit date: 3/1/2023     Years since quittin.3    Smokeless tobacco: Never   Substance Use Topics    Alcohol use: Never        HOME MEDICATIONS:     Prior to Admission medications    Medication Sig Start Date End Date Taking? Authorizing Provider   metFORMIN (GLUCOPHAGE) 500 MG tablet Take 500 mg by mouth daily with breakfast.    Provider, Historical   pantoprazole (PROTONIX) 40 MG tablet Take 1 tablet (40 mg total) by mouth once daily. 24  Alfonso Gardner MD   rosuvastatin (CRESTOR) 10 MG tablet Take 1 tablet (10 mg total) by mouth once daily. 3/24/25 3/24/26  Alfonso Gardner MD   telmisartan (MICARDIS) 80 MG Tab Take 1 tablet (80 mg total) by mouth once daily. 3/24/25 3/24/26  Alfonso Gardner MD   tirzepatide (MOUNJARO) 5 mg/0.5 mL PnIj Inject 5 mg into the skin every 7 days. 3/24/25 7/10/25  Alfonso Gardner MD       REVIEW OF SYSTEMS:   Except as documented, all other systems reviewed and negative     PHYSICAL EXAM:     VITAL SIGNS: 24 HRS MIN & MAX LAST   Temp  Min: 98.1 °F (36.7 °C)  Max: 98.7 °F (37.1 °C) 98.4 °F (36.9 °C)   BP  Min: 111/66  Max: 131/75 120/75   Pulse  Min: 76  Max: 108  96   Resp  Min: 12  Max: 25 18   SpO2  Min: 96 %  Max: 100 % 99 %     General appearance:  No acute distress  HENT: Atraumatic   Lungs: Clear to auscultation bilaterally.   Heart: RRR,No edema  Abdomen: Soft, non tender   Extremities: warm  Neuro:  Awake, alert, oriented  x4  Psych/mental status: Appropriate mood and affect.      LABS AND IMAGING:     Recent Labs   Lab 07/09/25  1940 07/10/25  0659   WBC 3.30* 3.10*   RBC 4.80 4.12*   HGB 13.6* 11.6*   HCT 38.4* 32.9*   MCV 80.0 79.9*   MCH 28.3 28.2   MCHC 35.4 35.3   RDW 13.7 13.8   * 106*   MPV 10.6* 10.8*       Recent Labs   Lab 07/09/25  1940 07/10/25  0659    137   K 3.6 3.5   CL 99 103   CO2 22 26   BUN 31.4* 23.5*   CREATININE 1.48* 1.19   * 179*   CALCIUM 9.1 8.5   MG  --  1.90   ALBUMIN 3.9 3.3*   PROT 7.3 6.2*   ALKPHOS 77 62   ALT 70* 54   AST 60* 44   BILITOT 1.3 1.2       Microbiology Results (last 7 days)       Procedure Component Value Units Date/Time    Blood culture x two cultures. Draw prior to antibiotics. [[id number]] Collected: 07/10/25 0153    Order Status: Resulted Specimen: Blood from Arm, Right Updated: 07/10/25 0203    Blood culture x two cultures. Draw prior to antibiotics. [[id number]] Collected: 07/10/25 0152    Order Status: Resulted Specimen: Blood from Arm, Left Updated: 07/10/25 0203             CT Chest Abdomen Pelvis With IV Contrast (XPD) NO Oral Contrast  Narrative: EXAMINATION:  CT CHEST ABDOMEN PELVIS WITH IV CONTRAST (XPD)    CLINICAL HISTORY:  Sepsis;    TECHNIQUE:  Axial images of the chest abdomen and pelvis were obtained with IV contrast administration.  Coronal and sagittal reconstructions were provided.  Three dimensional and MIP images were obtained and evaluated.  Total DLP was 609 mGy-cm. Dose lowering technique and automated exposure control were utilized for this exam.    COMPARISON:  None    FINDINGS:  The airway is widely patent.  There is no mediastinal or hilar lymphadenopathy.  There is no central pulmonary embolus.  The heart is not enlarged.    The lung parenchyma is clear.  There is no pulmonary nodule or mass.  There is a calcified granuloma in the right upper lobe.  There is no pleural effusion.  There is no ground-glass opacity.    There is diffuse  fatty infiltration of the liver.  The portal vein is patent.  The gallbladder is normal.  The spleen is enlarged.  The pancreas and adrenal glands are normal.  The bilateral kidneys are normal.  There is no hydronephrosis or nephrolithiasis.    The stomach and small bowel are decompressed.  The appendix is normal.  The colon is normal.  The urinary bladder is normal.  There is no pelvic or retroperitoneal adenopathy.  The aorta is nonaneurysmal.  There is no lytic or blastic osseous lesion.  Impression: 1. No acute abnormality of the chest abdomen and pelvis.  2. Hepatic steatosis and splenomegaly.  [employer] concordance    Electronically signed by: Conrad Wilkinson MD  Date:    07/10/2025  Time:    04:54      ASSESSMENT & PLAN:     Acute mild pancytopenia-secondary to infection versus related to Skyrizi  Sirs with fever-rule out sepsis, low suspicion  Mild transaminitis-improved   Essential HTN-stable   HLD   Type 2 diabetes mellitus with A1c 7.1  History of GERD   History of psoriasis currently on Skyrizi-completed 2 doses on biweekly frequency  Prophylaxis    Blood cultures x2   CT chest, abdomen, pelvis with no source of infection identified  UA indicative   COVID-19 PCR, RSV testing, influenza testing is negative   Check respiratory panel   HIV and hep panel negative  Also associated mild pancytopenia   This could be secondary to atypical infection versus related to recent initiation of Skyrizi   I have ordered peripheral smear, follow up results  Patient was initiated on broad-spectrum antimicrobials in the ED, at this time suspicion for sepsis is very low and therefore I will DC both and monitor off antimicrobials   Sliding scale for diabetes mellitus   Hold home metformin   Resumed other home meds-ARB, statin, ppi   DVT prophylaxis-subQ Lovenox    If fever free and blood count stable patient could possibly go home tomorrow      __________________________________________________________________________  INPATIENT  LIST OF MEDICATIONS     Scheduled Meds:   [START ON 7/11/2025] atorvastatin  20 mg Oral Daily    enoxparin  40 mg Subcutaneous Daily    [START ON 7/11/2025] losartan  50 mg Oral Daily    [START ON 7/11/2025] pantoprazole  40 mg Oral Daily    piperacillin-tazobactam (Zosyn) IV (PEDS and ADULTS) (extended infusion is not appropriate)  4.5 g Intravenous Q8H    vancomycin 1,500 mg in D5W 250 mL IVPB (admixture device)  1,500 mg Intravenous Q12H     Continuous Infusions:  PRN Meds:.  Current Facility-Administered Medications:     acetaminophen, 1,000 mg, Oral, Q6H PRN    aluminum-magnesium hydroxide-simethicone, 30 mL, Oral, QID PRN    bisacodyL, 10 mg, Rectal, Daily PRN    dextrose 50%, 12.5 g, Intravenous, PRN    dextrose 50%, 25 g, Intravenous, PRN    glucagon (human recombinant), 1 mg, Intramuscular, PRN    glucose, 16 g, Oral, PRN    glucose, 24 g, Oral, PRN    insulin aspart U-100, 0-10 Units, Subcutaneous, QID (AC + HS) PRN    melatonin, 6 mg, Oral, Nightly PRN    naloxone, 0.02 mg, Intravenous, PRN    ondansetron, 4 mg, Intravenous, Q4H PRN    prochlorperazine, 5 mg, Intravenous, Q6H PRN    senna-docusate, 1 tablet, Oral, BID PRN    sodium chloride 0.9%, 10 mL, Intravenous, PRN    vancomycin - pharmacy to dose, , Intravenous, pharmacy to manage frequency        If patient was admitted under observational status it is with my approval/permission.        All diagnosis and differential diagnosis have been reviewed; assessment and plan has been documented; I have personally reviewed the labs and test results that are presently available; I have reviewed the patients medication list; I have reviewed the consulting providers response and recommendations. I have reviewed or attempted to review medical records based upon their availability.    All of the patient and family questions have been addressed and answered. Patient's is agreeable to the above stated plan. I will continue to monitor closely and make adjustments  to medical management as needed.    If patient was admitted under observational status it is with my approval/permission.      Effie Singh MD   07/10/2025

## 2025-07-11 NOTE — DISCHARGE SUMMARY
[facility] Medicine Discharge Summary    Admit Date: 7/9/2025  Discharge Date and Time: 7/11/20253:23 PM  Admitting Physician: SHARDA Team  Discharging Physician: Effie Singh MD.  Primary Care Physician: Alfonso Gardner MD      Discharge Diagnoses:    Acute mild pancytopenia-secondary to infection versus related to Skyrizi  Sirs with fever-rule out sepsis, low suspicion  Mild transaminitis-improved   Essential HTN-stable   HLD   Type 2 diabetes mellitus with A1c 7.1  History of GERD   History of psoriasis currently on Skyrizi-completed 2 doses on biweekly frequency    Hospital Course:      39-year-old  male with significant history of HTN, HLD, type 2 diabetes mellitus, GERD, chronic tobacco use, psoriasis recently initiated on Skyrizi which he has completed 2 doses on a biweekly frequency.  Patient presented to the ED with complaints of high-grade temp spikes at own as cis 103° F. he denied any other symptoms including nausea,  diarrhea, shortness a breath, cough or any sick contacts.  Did have 2 episodes of vomiting in the past 24 hours. Slightly tachycardic in the ED, otherwise hemodynamics stable.  Labs were significant for mild pancytopenia.  And transaminitis, UDS was negative, he tested negative for influenza, hepatitis, MRSA PCR, HIV, RSV and COVID-19 testing also was negative.  CT chest, abdomen, pelvis with no acute abnormality, hospital medicine services consulted for admission, patient received broad-spectrum antibiotics in the ED. blood cultures ordered, no source of infection per CT chest, abdomen, pelvis, UA non impressive, COVID-19 PCR, RSV testing, influenza testing negative, respiratory panel also came back negative, HIV and hep panel negative, pancytopenia could be related to Skyrizi, peripheral smear ordered, non impressive, patient was given broad-spectrum antimicrobials in the ED, but decided to DC since suspicion for sepsis was very low, sliding  scale for diabetes mellitus, holding home metformin, A1c was more than 7, resumed other home medications.  Patient was re-evaluated on 07/11, no more fever spikes, hemodynamics stable, pancytopenia in fact improved, patient most likely had atypical viral infection which might have led to pancytopenia, it could also be related to Skyrizi, patient was symptomatically stable on 07/07, it was decided to discharge him home and recommended that he follow up with PCP and repeat WBC in 1 week, in case pancytopenia persist or worsening will have to follow up with Hematology as outpatient, will also have to consider switching Skyrizi, all these were thoroughly discussed with the patient and family member and they voiced understanding to everything explained, since A1c was more than 7 and nutrition visit was arranged in order to discuss diet control at home, recommended that he go up on metformin to 500 mg twice daily.  Discharge medications per med rec, follow up with PCP, repeat CBC in 1 week  Vitals:  VITAL SIGNS: 24 HRS MIN & MAX LAST   Temp  Min: 98.1 °F (36.7 °C)  Max: 98.6 °F (37 °C) 98.6 °F (37 °C)   BP  Min: 110/68  Max: 128/79 123/77   Pulse  Min: 83  Max: 96  83   Resp  Min: 17  Max: 18 17   SpO2  Min: 95 %  Max: 99 % 98 %       Physical Exam:  General appearance:  No acute distress  HENT: Atraumatic   Lungs: Clear to auscultation bilaterally.   Heart: RRR,No edema  Abdomen: Soft, non tender   Extremities: warm  Neuro:  Awake, alert, oriented x4  Psych/mental status: Appropriate mood and affect.      Procedures Performed: No admission procedures for hospital encounter.     Significant Diagnostic Studies: See Full reports for all details    Recent Labs   Lab 07/09/25  1940 07/10/25  0659 07/11/25  1140   WBC 3.30* 3.10* 3.18  3.18*   RBC 4.80 4.12* 4.34*   HGB 13.6* 11.6* 12.1*   HCT 38.4* 32.9* 34.9*   MCV 80.0 79.9* 80.4   MCH 28.3 28.2 27.9   MCHC 35.4 35.3 34.7   RDW 13.7 13.8 13.5   * 106* 138   MPV  10.6* 10.8* 10.8*       Recent Labs   Lab 07/09/25  1940 07/10/25  0659 07/11/25  1140    137 143   K 3.6 3.5 3.4*   CL 99 103 105   CO2 22 26 27   BUN 31.4* 23.5* 12.3   CREATININE 1.48* 1.19 0.88   * 179* 131*   CALCIUM 9.1 8.5 9.4   MG  --  1.90  --    ALBUMIN 3.9 3.3* 3.5   PROT 7.3 6.2* 6.7   ALKPHOS 77 62 76   ALT 70* 54 58*   AST 60* 44 44   BILITOT 1.3 1.2 1.0        Microbiology Results (last 7 days)       Procedure Component Value Units Date/Time    Blood culture x two cultures. Draw prior to antibiotics. [[id number]]  (Normal) Collected: 07/10/25 0153    Order Status: Completed Specimen: Blood from Arm, Right Updated: 07/11/25 1401     Blood Culture No Growth At 24 Hours    Blood culture x two cultures. Draw prior to antibiotics. [[id number]]  (Normal) Collected: 07/10/25 0152    Order Status: Completed Specimen: Blood from Arm, Left Updated: 07/11/25 1401     Blood Culture No Growth At 24 Hours             CT Chest Abdomen Pelvis With IV Contrast (XPD) NO Oral Contrast  Narrative: EXAMINATION:  CT CHEST ABDOMEN PELVIS WITH IV CONTRAST (XPD)    CLINICAL HISTORY:  Sepsis;    TECHNIQUE:  Axial images of the chest abdomen and pelvis were obtained with IV contrast administration.  Coronal and sagittal reconstructions were provided.  Three dimensional and MIP images were obtained and evaluated.  Total DLP was 609 mGy-cm. Dose lowering technique and automated exposure control were utilized for this exam.    COMPARISON:  None    FINDINGS:  The airway is widely patent.  There is no mediastinal or hilar lymphadenopathy.  There is no central pulmonary embolus.  The heart is not enlarged.    The lung parenchyma is clear.  There is no pulmonary nodule or mass.  There is a calcified granuloma in the right upper lobe.  There is no pleural effusion.  There is no ground-glass opacity.    There is diffuse fatty infiltration of the liver.  The portal vein is patent.  The gallbladder is normal.  The spleen is  enlarged.  The pancreas and adrenal glands are normal.  The bilateral kidneys are normal.  There is no hydronephrosis or nephrolithiasis.    The stomach and small bowel are decompressed.  The appendix is normal.  The colon is normal.  The urinary bladder is normal.  There is no pelvic or retroperitoneal adenopathy.  The aorta is nonaneurysmal.  There is no lytic or blastic osseous lesion.  Impression: 1. No acute abnormality of the chest abdomen and pelvis.  2. Hepatic steatosis and splenomegaly.  [employer] concordance    Electronically signed by: Conrad Wilkinson MD  Date:    07/10/2025  Time:    04:54         Medication List        PAUSE taking these medications      rosuvastatin 10 MG tablet  Wait to take this until your doctor or other care provider tells you to start again.  If repeat CPK is normal at PCP office  Commonly known as: CRESTOR  Take 1 tablet (10 mg total) by mouth once daily.            START taking these medications      blood sugar diagnostic Strp  Commonly known as: BLOOD GLUCOSE TEST  1 strip by Misc.(Non-Drug; Combo Route) route 2 (two) times a day.     blood-glucose meter kit  Use as instructed     lancets Misc  Commonly known as: LANCETS,THIN  1 Lancet by Misc.(Non-Drug; Combo Route) route 2 (two) times a day. Diagnosis-poorly-controlled type 2 diabetes mellitus            CHANGE how you take these medications      metFORMIN 500 MG tablet  Commonly known as: GLUCOPHAGE  Take 1 tablet (500 mg total) by mouth 2 (two) times daily with meals.  What changed: when to take this            CONTINUE taking these medications      pantoprazole 40 MG tablet  Commonly known as: PROTONIX  Take 1 tablet (40 mg total) by mouth once daily.     telmisartan 80 MG Tab  Commonly known as: MICARDIS  Take 1 tablet (80 mg total) by mouth once daily.               Where to Get Your Medications        These medications were sent to [facility] - [address]  [address]  [address], [place] [facility] [place] [address]      Phone: [phone]   blood sugar diagnostic Strp  blood-glucose meter kit  lancets Misc  metFORMIN 500 MG tablet          Explained in detail to the patient about the discharge plan, medications, and follow-up visits. Pt understands and agrees with the treatment plan  Discharge Disposition:     Discharged Condition: stable  Diet-   Dietary Orders (From admission, onward)       Start     Ordered    07/10/25 0057  Diet Adult Regular Standard Tray  Diet effective now        Question:  Tray type:  Answer:  Standard Tray    07/10/25 0056                   Medications Per DC med rec  Activities as tolerated    For further questions contact hospitalist office    Discharge time 33 minutes    For worsening symptoms, chest pain, shortness of breath, increased abdominal pain, high grade fever, stroke or stroke like symptoms, immediately go to the nearest Emergency Room or call 911 as soon as possible.      Effie Valle M.D, on 7/11/2025. at 3:23 PM.

## 2025-07-14 ENCOUNTER — PATIENT OUTREACH (OUTPATIENT)
Dept: ADMINISTRATIVE | Facility: CLINIC | Age: 40
End: 2025-07-14
Payer: COMMERCIAL

## 2025-07-14 NOTE — PROGRESS NOTES
C3 nurse spoke with Wilman Cano  for a TCC post hospital discharge follow up call. The patient does not have a scheduled HOSFU appointment with Alfonso Gardner MD  within 5-7 days post hospital discharge date 7/11/25. C3 nurse was unable to schedule HOSFU appointment in Epic. Advised wife Nikki to schedule HOSFU in 5-7 days.

## 2025-07-15 ENCOUNTER — TELEPHONE (OUTPATIENT)
Dept: PRIMARY CARE CLINIC | Facility: CLINIC | Age: 40
End: 2025-07-15
Payer: COMMERCIAL

## 2025-07-15 LAB
BACTERIA BLD CULT: NORMAL
BACTERIA BLD CULT: NORMAL

## 2025-07-15 NOTE — TELEPHONE ENCOUNTER
Copied from CRM #[id number]. Topic: Appointments - Appointment Scheduling  >> Jul 15, 2025 11:56 AM Eduarda wrote:  Type:  Sooner Apoointment Request    Caller is requesting a sooner appointment.  Caller declined first available appointment listed below.  Caller will not accept being placed on the waitlist and is requesting a message be sent to doctor.    Name of Caller:  Nahed, pt spouse    When is the first available appointment? Thurs Aug 7    Symptoms: needs HFU    Would the patient rather a call back or a response via [facility]?  Phone call    Best Call Back Number: [phone]    Additional Information: pt needs HFU, spouse states he also needs CBC before the appt. Please advise

## 2025-07-21 DIAGNOSIS — K21.9 GERD WITHOUT ESOPHAGITIS: Primary | ICD-10-CM

## 2025-07-21 RX ORDER — PANTOPRAZOLE SODIUM 40 MG/1
40 TABLET, DELAYED RELEASE ORAL DAILY
Qty: 90 TABLET | Refills: 3 | Status: SHIPPED | OUTPATIENT
Start: 2025-07-21 | End: 2026-07-21

## 2025-07-24 ENCOUNTER — OFFICE VISIT (OUTPATIENT)
Dept: PRIMARY CARE CLINIC | Facility: CLINIC | Age: 40
End: 2025-07-24
Payer: COMMERCIAL

## 2025-07-24 ENCOUNTER — TELEPHONE (OUTPATIENT)
Dept: PRIMARY CARE CLINIC | Facility: CLINIC | Age: 40
End: 2025-07-24

## 2025-07-24 VITALS
WEIGHT: 225.63 LBS | OXYGEN SATURATION: 98 % | HEIGHT: 68 IN | BODY MASS INDEX: 34.19 KG/M2 | HEART RATE: 101 BPM | TEMPERATURE: 98 F | DIASTOLIC BLOOD PRESSURE: 80 MMHG | SYSTOLIC BLOOD PRESSURE: 126 MMHG | RESPIRATION RATE: 18 BRPM

## 2025-07-24 DIAGNOSIS — D61.818 PANCYTOPENIA: Primary | ICD-10-CM

## 2025-07-24 DIAGNOSIS — E11.9 TYPE 2 DIABETES MELLITUS WITHOUT COMPLICATION, WITHOUT LONG-TERM CURRENT USE OF INSULIN: Chronic | ICD-10-CM

## 2025-07-24 DIAGNOSIS — R74.8 ELEVATED CK: ICD-10-CM

## 2025-07-24 LAB
ALBUMIN SERPL-MCNC: 4.5 G/DL (ref 3.5–5)
ALBUMIN/GLOB SERPL: 1.6 RATIO (ref 1.1–2)
ALP SERPL-CCNC: 90 UNIT/L (ref 40–150)
ALT SERPL-CCNC: 87 UNIT/L (ref 0–55)
ANION GAP SERPL CALC-SCNC: 10 MEQ/L
AST SERPL-CCNC: 37 UNIT/L (ref 11–45)
BASOPHILS # BLD AUTO: 0.08 X10(3)/MCL
BASOPHILS NFR BLD AUTO: 1 %
BILIRUB SERPL-MCNC: 0.9 MG/DL
BUN SERPL-MCNC: 13.6 MG/DL (ref 8.9–20.6)
CALCIUM SERPL-MCNC: 9.4 MG/DL (ref 8.4–10.2)
CHLORIDE SERPL-SCNC: 106 MMOL/L (ref 98–107)
CK SERPL-CCNC: 125 U/L (ref 30–200)
CO2 SERPL-SCNC: 25 MMOL/L (ref 22–29)
CREAT SERPL-MCNC: 0.86 MG/DL (ref 0.72–1.25)
CREAT/UREA NIT SERPL: 16
EOSINOPHIL # BLD AUTO: 0.22 X10(3)/MCL (ref 0–0.9)
EOSINOPHIL NFR BLD AUTO: 2.7 %
ERYTHROCYTE [DISTWIDTH] IN BLOOD BY AUTOMATED COUNT: 14.3 % (ref 11.5–17)
GFR SERPLBLD CREATININE-BSD FMLA CKD-EPI: >60 ML/MIN/1.73/M2
GLOBULIN SER-MCNC: 2.9 GM/DL (ref 2.4–3.5)
GLUCOSE SERPL-MCNC: 95 MG/DL (ref 74–100)
HCT VFR BLD AUTO: 43.9 % (ref 42–52)
HGB BLD-MCNC: 14.6 G/DL (ref 14–18)
IMM GRANULOCYTES # BLD AUTO: 0.05 X10(3)/MCL (ref 0–0.04)
IMM GRANULOCYTES NFR BLD AUTO: 0.6 %
LYMPHOCYTES # BLD AUTO: 2.2 X10(3)/MCL (ref 0.6–4.6)
LYMPHOCYTES NFR BLD AUTO: 26.8 %
MCH RBC QN AUTO: 28.3 PG (ref 27–31)
MCHC RBC AUTO-ENTMCNC: 33.3 G/DL (ref 33–36)
MCV RBC AUTO: 85.1 FL (ref 80–94)
MONOCYTES # BLD AUTO: 0.8 X10(3)/MCL (ref 0.1–1.3)
MONOCYTES NFR BLD AUTO: 9.7 %
NEUTROPHILS # BLD AUTO: 4.86 X10(3)/MCL (ref 2.1–9.2)
NEUTROPHILS NFR BLD AUTO: 59.2 %
NRBC BLD AUTO-RTO: 0 %
PLATELET # BLD AUTO: 245 X10(3)/MCL (ref 130–400)
PMV BLD AUTO: 9.9 FL (ref 7.4–10.4)
POTASSIUM SERPL-SCNC: 4.4 MMOL/L (ref 3.5–5.1)
PROT SERPL-MCNC: 7.4 GM/DL (ref 6.4–8.3)
RBC # BLD AUTO: 5.16 X10(6)/MCL (ref 4.7–6.1)
SODIUM SERPL-SCNC: 141 MMOL/L (ref 136–145)
WBC # BLD AUTO: 8.21 X10(3)/MCL (ref 4.5–11.5)

## 2025-07-24 PROCEDURE — 80053 COMPREHEN METABOLIC PANEL: CPT | Performed by: NURSE PRACTITIONER

## 2025-07-24 PROCEDURE — 82550 ASSAY OF CK (CPK): CPT | Performed by: NURSE PRACTITIONER

## 2025-07-24 PROCEDURE — 85025 COMPLETE CBC W/AUTO DIFF WBC: CPT | Performed by: NURSE PRACTITIONER

## 2025-07-24 RX ORDER — METFORMIN HYDROCHLORIDE 1000 MG/1
1000 TABLET ORAL 2 TIMES DAILY WITH MEALS
Qty: 60 TABLET | Refills: 2 | Status: SHIPPED | OUTPATIENT
Start: 2025-07-24 | End: 2025-08-23

## 2025-07-24 NOTE — PROGRESS NOTES
Internal Medicine    Patient ID: [id number]     Chief Complaint: Follow-up (Hospital follow up /Patient needs to get put back on metformin )    HPI:     History of Present Illness    CHIEF COMPLAINT:  Patient presents today for follow up after recent hospitalization.    RECENT HOSPITALIZATION:  He was hospitalized from 07/09 to 07/11 for fever and pancytopenia. He initially developed a fever of 103°F on Monday while returning from vacation. Prior to admission, he visited a walking clinic on Tuesday where flu testing was negative. He experienced persistent fever despite alternating antipyretics. He denies coughing or congestion during this illness. His fever resolved during hospitalization and he remained afebrile. No specific source of infection was identified, with medical team suspecting possible viral etiology. Liver enzymes were initially elevated at admission but demonstrated improvement by discharge. He acknowledges potential alcohol consumption as a possible contributing factor to liver enzyme elevation.    DIABETES:  His current A1C is 7.1, which is not within optimal control range. He acknowledges not following a strict diabetic diet and expresses awareness that dietary modifications are necessary for better glycemic management. He has made recent attempts to improve dietary habits but has not yet fully implemented a comprehensive diabetic diet plan.    MEDICATIONS:   His last Skirizi treatment was administered on June 22nd. He discontinued Mounjaro due to medication availability and stomach discomfort. He continues Metformin 500 mg twice daily for the past couple months. Crestor is currently paused, likely due to elevated liver enzymes and elevated CK.          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    Hypertension     Known health problems: none         Past Surgical History:   Procedure Laterality Date    NO PAST SURGERIES          Social History  "    Tobacco Use    Smoking status: Former     Current packs/day: 0.00     Average packs/day: 1.5 packs/day for 23.2 years (34.7 ttl pk-yrs)     Types: Cigarettes     Start date: 2000     Quit date: 3/1/2023     Years since quittin.4    Smokeless tobacco: Never   Substance and Sexual Activity    Alcohol use: Never    Drug use: Never    Sexual activity: Yes     Partners: Female     Birth control/protection: None        Current Outpatient Medications   Medication Instructions    blood sugar diagnostic (BLOOD GLUCOSE TEST) Strp 1 strip, Misc.(Non-Drug; Combo Route), 2 times daily    blood-glucose meter kit Use as instructed    lancets (LANCETS,THIN) Misc 1 Lancet, Misc.(Non-Drug; Combo Route), 2 times daily, Diagnosis-poorly-controlled type 2 diabetes mellitus    metFORMIN (GLUCOPHAGE) 1,000 mg, Oral, 2 times daily with meals    pantoprazole (PROTONIX) 40 mg, Oral, Daily    [Paused] rosuvastatin (CRESTOR) 10 mg, Oral, Daily    telmisartan (MICARDIS) 80 mg, Oral, Daily       Review of patient's allergies indicates:   Allergen Reactions    Penicillins         Patient Care Team:  Alfonso Gardner MD as PCP - General (Family Medicine)     Subjective:     Review of Systems    12 point review of systems conducted, negative except as stated in the history of present illness. See HPI for details.    Objective:     Visit Vitals  /80 (Patient Position: Sitting)   Pulse 101   Temp 98.2 °F (36.8 °C)   Resp 18   Ht 5' 8" (1.727 m)   Wt 102.3 kg (225 lb 9.6 oz)   SpO2 98%   BMI 34.30 kg/m²       Physical Exam    Physical Exam    General: No acute distress. Well-developed. Well-nourished.  Eyes: EOMI. Sclerae anicteric.  HENT: Normocephalic. Atraumatic. Nares patent. Moist oral mucosa.  Cardiovascular: Regular rate. Regular rhythm. No murmurs. No rubs. No gallops. Normal S1, S2.  Respiratory: Normal respiratory effort. Clear to auscultation bilaterally. No rales. No rhonchi. No wheezing.  Musculoskeletal: No  obvious " deformity.  Extremities: No lower extremity edema.  Neurological: Alert & oriented x3. No slurred speech. Normal gait.  Psychiatric: Normal mood. Normal affect. Good insight. Good judgment.  Skin: Warm. Dry. No rash.         Labs Reviewed:     Chemistry:  Lab Results   Component Value Date     07/11/2025    K 3.4 (L) 07/11/2025    BUN 12.3 07/11/2025    CREATININE 0.88 07/11/2025    EGFRNORACEVR >60 07/11/2025    CALCIUM 9.4 07/11/2025    ALKPHOS 76 07/11/2025    ALBUMIN 3.5 07/11/2025    BILIDIR 0.3 03/11/2022    IBILI 0.50 03/11/2022    AST 44 07/11/2025    ALT 58 (H) 07/11/2025    MG 1.90 07/10/2025    TSH 1.492 03/19/2025        Lab Results   Component Value Date    HGBA1C 7.1 (H) 07/10/2025        Hematology:  Lab Results   Component Value Date    WBC 3.18 (L) 07/11/2025    WBC 3.18 07/11/2025    HGB 12.1 (L) 07/11/2025    HCT 34.9 (L) 07/11/2025     07/11/2025       Lipid Panel:  Lab Results   Component Value Date    CHOL 105 03/19/2025    HDL 33 (L) 03/19/2025    LDL 47.00 (L) 03/19/2025    TRIG 125 03/19/2025    TOTALCHOLEST 3 03/19/2025        Assessment/Plan:     Assessment & Plan    PLAN SUMMARY:  - Increase Metformin to 1000 mg twice daily (2 500 mg tablets with breakfast and dinner)  - Order CBC to reassess blood counts  - Order liver function tests  - Contact Dr. Baig before next dose of Skyrizi due  - Adhere to strict diabetic diet  - Follow-up to ensure complete normalization of liver function    TYPE 2 DIABETES MELLITUS WITHOUT COMPLICATION, WITHOUT LONG-TERM CURRENT USE OF INSULIN:  - Explained importance of strict diabetic diet in managing and potentially reversing diabetes.  - Discussed how diet adherence improves tolerability of GLP-1 receptor agonists like Mounjaro.  - Increased Metformin from 500 mg twice daily to 1000 mg twice daily (2 500 mg tablets with breakfast and 2 with dinner) and started new prescription for Metformin 1000 mg tablets.    PANCYTOPENIA:  - Ordered CBC to  reassess blood counts.    PSORIASIS, UNSPECIFIED:  - Discussed the medication Skyrizi, which is a high-risk medication with side effects including blood count issues.  - Advised the patient to contact Dr. Baig before the next dose.    FEVER, UNSPECIFIED:  - Patient presented to the ED with a temperature of 103 and mild pancytopenia.  - Fever was present on Monday and Tuesday, but not during the hospital stay.  - No recurrent fevers reported by the patient, who is feeling good currently.  - Discussed the fever being potentially viral, with no source of infection identified.    LIVER DISORDERS IN DISEASES CLASSIFIED ELSEWHERE:  - Discussed potential causes for elevated liver enzymes, including alcohol consumption and medication use.  - Will continue to monitor liver function to ensure complete normalization.          No follow-ups on file. In addition to their scheduled follow up, the patient has also been instructed to follow up on as needed basis.     Future Appointments   Date Time Provider Department Center   9/30/2025  7:20 AM NURSE, [facility] PRIMARY CARE [facility]   10/2/2025  8:30 AM Alfonso Gardner MD [facility] Morgan Louviere, FNP    This note was generated with the assistance of ambient listening technology. Verbal consent was obtained by the patient and accompanying visitor(s) for the recording of patient appointment to facilitate this note. I attest to having reviewed and edited the generated note for accuracy, though some syntax or spelling errors may persist. Please contact the author of this note for any clarification.

## 2025-07-24 NOTE — TELEPHONE ENCOUNTER
----- Message from ZHAO Jim sent at 7/24/2025  4:08 PM CDT -----  Please inform patient of results.    1. ALT remains elevated but this is not new - had elevation in 2024. Other labwork has normalized including RBC/WBC/Platelets and CK level. Let's continue to hold the statin and repeat CMP at next   visit. Follow strict diet as discussed.    Thanks for all you do,    Bro    ----- Message -----  From: Lab, Background User  Sent: 7/24/2025   1:13 PM CDT  To: ZHAO Mae

## 2025-07-30 ENCOUNTER — TELEPHONE (OUTPATIENT)
Dept: PRIMARY CARE CLINIC | Facility: CLINIC | Age: 40
End: 2025-07-30
Payer: COMMERCIAL

## 2025-07-30 DIAGNOSIS — E11.9 TYPE 2 DIABETES MELLITUS WITHOUT COMPLICATION, WITHOUT LONG-TERM CURRENT USE OF INSULIN: Primary | ICD-10-CM

## 2025-07-30 RX ORDER — LANCETS
1 EACH MISCELLANEOUS 2 TIMES DAILY
Qty: 200 EACH | Refills: 1 | Status: SHIPPED | OUTPATIENT
Start: 2025-07-30

## 2025-07-30 NOTE — TELEPHONE ENCOUNTER
Copied from CRM #[id number]. Topic: General Inquiry - Patient Advice  >> Jul 30, 2025  9:02 AM Shanta wrote:  Pt wife Nahed requesting a call back about pt med metFORMIN (GLUCOPHAGE) 1000 MG tablet,blood sugar diagnostic (BLOOD GLUCOSE TEST) [facility], call back [phone]

## 2025-07-30 NOTE — TELEPHONE ENCOUNTER
Called and spoke to wife, she wanted to clarify what mg of Metformin pt is suppose to be on. Clarified that pt is suppose to be on 1,000 mg BID. Spouse vocalized understanding and had no further questions. She also stated that pt needed refills on lancets and test strips. [facility].

## 2025-08-20 DIAGNOSIS — K21.9 GERD WITHOUT ESOPHAGITIS: Primary | ICD-10-CM

## 2025-08-20 RX ORDER — PANTOPRAZOLE SODIUM 40 MG/1
40 TABLET, DELAYED RELEASE ORAL DAILY
Qty: 90 TABLET | Refills: 3 | Status: SHIPPED | OUTPATIENT
Start: 2025-08-20 | End: 2026-08-20